# Patient Record
Sex: FEMALE | Race: WHITE | Employment: FULL TIME | URBAN - METROPOLITAN AREA
[De-identification: names, ages, dates, MRNs, and addresses within clinical notes are randomized per-mention and may not be internally consistent; named-entity substitution may affect disease eponyms.]

---

## 2018-08-09 ENCOUNTER — OFFICE VISIT (OUTPATIENT)
Dept: FAMILY MEDICINE | Facility: CLINIC | Age: 23
End: 2018-08-09

## 2018-08-09 VITALS
SYSTOLIC BLOOD PRESSURE: 111 MMHG | TEMPERATURE: 98 F | DIASTOLIC BLOOD PRESSURE: 67 MMHG | HEART RATE: 64 BPM | RESPIRATION RATE: 16 BRPM | OXYGEN SATURATION: 98 % | WEIGHT: 128.2 LBS

## 2018-08-09 DIAGNOSIS — B07.8 COMMON WART: Primary | ICD-10-CM

## 2018-08-09 PROCEDURE — 17110 DESTRUCTION B9 LES UP TO 14: CPT | Performed by: NURSE PRACTITIONER

## 2018-08-09 RX ORDER — SERTRALINE HYDROCHLORIDE 100 MG/1
200 TABLET, FILM COATED ORAL DAILY
COMMUNITY
Start: 2018-04-20

## 2018-08-09 RX ORDER — LEVONORGESTREL/ETHIN.ESTRADIOL 0.1-0.02MG
1 TABLET ORAL DAILY
COMMUNITY
Start: 2017-12-28 | End: 2023-05-08

## 2018-08-09 NOTE — PROGRESS NOTES
SUBJECTIVE:   Olga Turner is a 22 year old female who presents to clinic today for the following health issues:      WART(S)      Onset: several  months     Description (location/number): knees and thighs and on on her abdomen area    Accompanying signs and symptoms: Painful: no     History: prior warts: YES- had removed some in the pass, but came back and got bigger.      Therapies tried and outcome: OTC wart removal, but it has been making it worse.    Has tried compound W with no relief.  Warts spreading and getting bigger.    Does not have medical insurance and wants to get them treated today.  Aware of the cost.    Problem list and histories reviewed & adjusted, as indicated.  Additional history: as documented    Reviewed and updated as needed this visit by clinical staff  Allergies  Meds  Problems  Med Hx  Surg Hx  Fam Hx       Reviewed and updated as needed this visit by Provider  Meds  Problems  Med Hx  Surg Hx  Fam Hx         ROS:  Constitutional, HEENT, cardiovascular, pulmonary, gi and gu systems are negative, except as otherwise noted.    OBJECTIVE:     /67 (BP Location: Left arm, Patient Position: Sitting, Cuff Size: Adult Regular)  Pulse 64  Temp 98  F (36.7  C) (Oral)  Resp 16  Wt 128 lb 3.2 oz (58.2 kg)  LMP 08/04/2018 (Exact Date)  SpO2 98%  There is no height or weight on file to calculate BMI.  GENERAL: healthy, alert and no distress  EYES: Eyes grossly normal to inspection, PERRL and conjunctivae and sclerae normal  ABDOMEN: soft, nontender, no hepatosplenomegaly, no masses and bowel sounds normal  MS: no gross musculoskeletal defects noted, no edema  SKIN: 10+ scattered warts, 3 clusters of 2-3mm verrucous papules on both knees, 4 scattered 3 mm lesions on bilateral thighs and one  2mm lesion on RLQ abdomen.    PSYCH: mentation appears normal, affect normal/bright      ASSESSMENT/PLAN:       ICD-10-CM    1. Common wart B07.8 levonorgestrel-ethinyl estradiol  (ANA LEWIS,LESSINA) 0.1-20 MG-MCG per tablet     sertraline (ZOLOFT) 50 MG tablet     DESTRUCT BENIGN LESION, UP TO 14     All  Warts on pt pared with 10 blade, froze with 3 freeze/thaw cycles.  Advised to return in 4 weeks if warts present.    Discussed possibility of scarring.  Discussed warts resistant to treatment, important to f/u and not wiat if persists or worsens.      SHADY Cox CNP  [facility]

## 2018-08-09 NOTE — MR AVS SNAPSHOT
"              After Visit Summary   2018    Olga Turner    MRN: [id number]           Patient Information     Date Of Birth          1995        Visit Information        Provider Department      2018 3:20 PM Tory Aden APRN CNP [facility]        Today's Diagnoses     Common wart    -  1       Follow-ups after your visit        Who to contact     If you have questions or need follow up information about today's clinic visit or your schedule please contact [facility] directly at [phone].  Normal or non-critical lab and imaging results will be communicated to you by [employer], letter or phone within 4 business days after the clinic has received the results. If you do not hear from us within 7 days, please contact the clinic through [employer]Hub[employer] or phone. If you have a critical or abnormal lab result, we will notify you by phone as soon as possible.  Submit refill requests through [employer] or call your pharmacy and they will forward the refill request to us. Please allow 3 business days for your refill to be completed.          Additional Information About Your Visit        MyChart Information     [employer] lets you send messages to your doctor, view your test results, renew your prescriptions, schedule appointments and more. To sign up, go to [email or web address] . Click on \"Log in\" on the left side of the screen, which will take you to the Welcome page. Then click on \"Sign up Now\" on the right side of the page.     You will be asked to enter the access code listed below, as well as some personal information. Please follow the directions to create your username and password.     Your access code is: [email or web address]  Expires: 2018  4:07 PM     Your access code will  in 90 days. If you need help or a new code, please call your [facility] or [phone].        Care EveryWhere ID     This is your Care EveryWhere ID. This could be used by other " organizations to access your [place] medical records  [id number]        Your Vitals Were     Pulse Temperature Respirations Last Period Pulse Oximetry       64 98  F (36.7  C) (Oral) 16 08/04/2018 (Exact Date) 98%        Blood Pressure from Last 3 Encounters:   08/09/18 111/67    Weight from Last 3 Encounters:   08/09/18 128 lb 3.2 oz (58.2 kg)              We Performed the Following     DESTRUCT BENIGN LESION, UP TO 14        Primary Care Provider Fax #    Physician No Ref-Primary [phone]       No address on file        Equal Access to Services     [facility]: Hadii aad ku hadasho Soomaali, waaxda luqadaha, qaybta kaalmada adeegyada, xiang montejo . So [facility] [place] [phone].    ATENCIÓN: Si habla español, tiene a mackay disposición servicios gratuitos de asistencia lingüística. Llame al [phone].    We comply with applicable federal civil rights laws and [place] laws. We do not discriminate on the basis of race, color, national origin, age, disability, sex, sexual orientation, or gender identity.            Thank you!     Thank you for choosing [facility]  for your care. Our goal is always to provide you with excellent care. Hearing back from our patients is one way we can continue to improve our services. Please take a few minutes to complete the written survey that you may receive in the mail after your visit with us. Thank you!             Your Updated Medication List - Protect others around you: Learn how to safely use, store and throw away your medicines at www.disposemymeds.org.          This list is accurate as of 8/9/18  4:07 PM.  Always use your most recent med list.                   Brand Name Dispense Instructions for use Diagnosis    levonorgestrel-ethinyl estradiol 0.1-20 MG-MCG per tablet    ANA LEWIS LESSINA     Take 1 tablet by mouth daily    Common wart       sertraline 50 MG tablet    ZOLOFT     Take 50 mg by mouth daily    Common wart

## 2018-09-26 ENCOUNTER — HEALTH MAINTENANCE LETTER (OUTPATIENT)
Age: 23
End: 2018-09-26

## 2018-10-26 ENCOUNTER — OFFICE VISIT (OUTPATIENT)
Dept: FAMILY MEDICINE | Facility: CLINIC | Age: 23
End: 2018-10-26

## 2018-10-26 VITALS
OXYGEN SATURATION: 100 % | BODY MASS INDEX: 24.29 KG/M2 | RESPIRATION RATE: 16 BRPM | SYSTOLIC BLOOD PRESSURE: 103 MMHG | WEIGHT: 132 LBS | HEART RATE: 56 BPM | TEMPERATURE: 98.1 F | HEIGHT: 62 IN | DIASTOLIC BLOOD PRESSURE: 63 MMHG

## 2018-10-26 DIAGNOSIS — B07.8 COMMON WART: Primary | ICD-10-CM

## 2018-10-26 PROCEDURE — 17110 DESTRUCTION B9 LES UP TO 14: CPT | Performed by: NURSE PRACTITIONER

## 2018-10-26 NOTE — MR AVS SNAPSHOT
"              After Visit Summary   10/26/2018    Olga Turner    MRN: [id number]           Patient Information     Date Of Birth          1995        Visit Information        Provider Department      10/26/2018 3:00 PM Chela Serrano APRN CNP [facility]        Today's Diagnoses     Common wart    -  1       Follow-ups after your visit        Who to contact     If you have questions or need follow up information about today's clinic visit or your schedule please contact [facility] directly at [phone].  Normal or non-critical lab and imaging results will be communicated to you by MyChart, letter or phone within 4 business days after the clinic has received the results. If you do not hear from us within 7 days, please contact the clinic through MyChart or phone. If you have a critical or abnormal lab result, we will notify you by phone as soon as possible.  Submit refill requests through [employer] or call your pharmacy and they will forward the refill request to us. Please allow 3 business days for your refill to be completed.          Additional Information About Your Visit        Care EveryWhere ID     This is your Care EveryWhere ID. This could be used by other organizations to access your [facility] medical records  [id number]        Your Vitals Were     Pulse Temperature Respirations Height Pulse Oximetry BMI (Body Mass Index)    56 98.1  F (36.7  C) (Oral) 16 5' 2\" (1.575 m) 100% 24.14 kg/m2       Blood Pressure from Last 3 Encounters:   10/26/18 103/63   08/09/18 111/67    Weight from Last 3 Encounters:   10/26/18 132 lb (59.9 kg)   08/09/18 128 lb 3.2 oz (58.2 kg)              We Performed the Following     DESTRUCT BENIGN LESION, UP TO 14        Primary Care Provider Office Phone # Fax #    SHADY Cox [phone] [address] [facility] [address]        Equal Access to Services     [facility]ARGAS [facility]: Hadii " yolande Smith, wajosselinda lucaryadaha, qaybta kaalwilman alonzo, waxcharan idiin haybridgettnadir yodershellicarroll schaeferMatthiasrandi alfredo. So [facility] [phone].    ATENCIÓN: Si habla español, tiene a mackay disposición servicios gratuitos de asistencia lingüística. Gilmaame al [phone].    We comply with applicable federal civil rights laws and [place] laws. We do not discriminate on the basis of race, color, national origin, age, disability, sex, sexual orientation, or gender identity.            Thank you!     Thank you for choosing [facility]  for your care. Our goal is always to provide you with excellent care. Hearing back from our patients is one way we can continue to improve our services. Please take a few minutes to complete the written survey that you may receive in the mail after your visit with us. Thank you!             Your Updated Medication List - Protect others around you: Learn how to safely use, store and throw away your medicines at www.disposemymeds.org.          This list is accurate as of 10/26/18 11:59 PM.  Always use your most recent med list.                   Brand Name Dispense Instructions for use Diagnosis    levonorgestrel-ethinyl estradiol 0.1-20 MG-MCG per tablet    ANA LEWIS LESSINA     Take 1 tablet by mouth daily    Common wart       sertraline 50 MG tablet    ZOLOFT     Take 50 mg by mouth daily    Common wart

## 2018-10-26 NOTE — PROGRESS NOTES
SUBJECTIVE:   Olga Turner is a 23 year old female who presents to clinic today for the following health issues:      WART(S)      Onset: 1 month     Description (location/number):Olga is in the clinic today to have the warts on her legs treated. They have been present for a while and the treated in August. One of the warts did go away, but the other warts did not. Today she has 2 warts on each knee and a wart o each shin. She denies any other issues today.     Accompanying signs and symptoms: Painful: no     History: prior warts: YES    Therapies tried and outcome: tried OTC brush - did not help       Problem list and histories reviewed & adjusted, as indicated.  Additional history: as documented    Patient Active Problem List   Diagnosis     Common wart     History reviewed. No pertinent surgical history.    Social History   Substance Use Topics     Smoking status: Never Smoker     Smokeless tobacco: Never Used     Alcohol use Yes      Comment: occ     Family History   Problem Relation Age of Onset     No Known Problems Mother      No Known Problems Father          Current Outpatient Prescriptions   Medication Sig Dispense Refill     levonorgestrel-ethinyl estradiol (AVIANE,ALESSE,LESSINA) 0.1-20 MG-MCG per tablet Take 1 tablet by mouth daily       sertraline (ZOLOFT) 50 MG tablet Take 50 mg by mouth daily       Allergies   Allergen Reactions     Penicillin G Other (See Comments)     amoxicillin: rash    May use PCN/ceph-NO amox/amp     Erythromycin GI Disturbance and Rash     No lab results found.   BP Readings from Last 3 Encounters:   10/26/18 103/63   08/09/18 111/67    Wt Readings from Last 3 Encounters:   10/26/18 132 lb (59.9 kg)   08/09/18 128 lb 3.2 oz (58.2 kg)                  Labs reviewed in EPIC    Reviewed and updated as needed this visit by clinical staff  Tobacco  Allergies  Meds  Med Hx  Surg Hx  Fam Hx  Soc Hx      Reviewed and updated as needed this visit by Provider      "    ROS:  Constitutional, HEENT, cardiovascular, pulmonary, GI, , musculoskeletal, neuro, skin, endocrine and psych systems are negative, except as otherwise noted.    OBJECTIVE:     /63  Pulse 56  Temp 98.1  F (36.7  C) (Oral)  Resp 16  Ht 5' 2\" (1.575 m)  Wt 132 lb (59.9 kg)  SpO2 100%  BMI 24.14 kg/m2  Body mass index is 24.14 kg/(m^2).    GENERAL APPEARANCE: healthy, alert and no distress. Smiling.   SKIN: warm and dry  She has domed papules consistent with warts: 2 on each knee and one on each shin.     PSYCH: mentation appears normal and affect normal/bright.  Good eye contact.      ASSESSMENT/PLAN:     (B07.8) Common wart  (primary encounter diagnosis)  Comment:   Plan: DESTRUCT BENIGN LESION, UP TO 14        The treatments, side effects and failure rates are discussed. Liquid nitrogen was applied to the warts for 3 bursts 10 seconds on each. The expected skin reaction including erythema, pain, scabbing, blistering and hypopigmented scar formation was discussed. See at intervals until warts resolved.           SHADY Dhillon [facility]  "

## 2023-04-28 ENCOUNTER — TELEPHONE (OUTPATIENT)
Dept: BEHAVIORAL HEALTH | Facility: CLINIC | Age: 28
End: 2023-04-28
Payer: COMMERCIAL

## 2023-04-28 NOTE — TELEPHONE ENCOUNTER
Pt is a(n) adult (18+ out of HS) Seeking as eval for Adult Mental Health DA for evaluation and recommendations. and is interested in Adult Evaluation only - no specific program requested.  Appointment scheduled by:  Patient.  (If patient is self-pay, we much complete a Cost Estimate and save it to the pt chart)  Caller name:  Olga    Caller phone #: [phone]  If in person, please state reason why:  NA  Brief reason for appt:  Pt looking for DA eval for MH DX.     Cost estimate Did not get completed.  Contact information verified/updated: Yes

## 2023-05-02 ENCOUNTER — TELEPHONE (OUTPATIENT)
Dept: BEHAVIORAL HEALTH | Facility: CLINIC | Age: 28
End: 2023-05-02
Payer: COMMERCIAL

## 2023-05-02 NOTE — TELEPHONE ENCOUNTER
----- Message from Ratna Mari sent at 5/2/2023  2:16 PM CDT -----  Regarding: BENEFITS FOR APPT TOMORROW, 5/3  Good Afternoon-    Please request benefits for appt tomorrow ASAP.    Thank you!    Ratna CRENSHAW  Assessment Center Coordinator

## 2023-05-03 ENCOUNTER — HOSPITAL ENCOUNTER (OUTPATIENT)
Dept: BEHAVIORAL HEALTH | Facility: CLINIC | Age: 28
Discharge: HOME | End: 2023-05-03
Attending: FAMILY MEDICINE | Admitting: FAMILY MEDICINE
Payer: COMMERCIAL

## 2023-05-03 PROCEDURE — 90791 PSYCH DIAGNOSTIC EVALUATION: CPT | Mod: GT,95 | Performed by: COUNSELOR

## 2023-05-03 RX ORDER — BUSPIRONE HYDROCHLORIDE 10 MG/1
1 TABLET ORAL
COMMUNITY
Start: 2023-02-12

## 2023-05-03 RX ORDER — HYDROXYZINE HYDROCHLORIDE 25 MG/1
25 TABLET, FILM COATED ORAL EVERY 6 HOURS PRN
COMMUNITY
Start: 2022-05-01

## 2023-05-03 ASSESSMENT — COLUMBIA-SUICIDE SEVERITY RATING SCALE - C-SSRS
TOTAL  NUMBER OF INTERRUPTED ATTEMPTS LIFETIME: NO
TOTAL  NUMBER OF ABORTED OR SELF INTERRUPTED ATTEMPTS LIFETIME: NO
1. HAVE YOU WISHED YOU WERE DEAD OR WISHED YOU COULD GO TO SLEEP AND NOT WAKE UP?: NO
ATTEMPT LIFETIME: NO
6. HAVE YOU EVER DONE ANYTHING, STARTED TO DO ANYTHING, OR PREPARED TO DO ANYTHING TO END YOUR LIFE?: NO
2. HAVE YOU ACTUALLY HAD ANY THOUGHTS OF KILLING YOURSELF?: NO

## 2023-05-03 ASSESSMENT — ANXIETY QUESTIONNAIRES
7. FEELING AFRAID AS IF SOMETHING AWFUL MIGHT HAPPEN: NEARLY EVERY DAY
1. FEELING NERVOUS, ANXIOUS, OR ON EDGE: NEARLY EVERY DAY
7. FEELING AFRAID AS IF SOMETHING AWFUL MIGHT HAPPEN: NEARLY EVERY DAY
2. NOT BEING ABLE TO STOP OR CONTROL WORRYING: NEARLY EVERY DAY
6. BECOMING EASILY ANNOYED OR IRRITABLE: MORE THAN HALF THE DAYS
5. BEING SO RESTLESS THAT IT IS HARD TO SIT STILL: SEVERAL DAYS
GAD7 TOTAL SCORE: 17
8. IF YOU CHECKED OFF ANY PROBLEMS, HOW DIFFICULT HAVE THESE MADE IT FOR YOU TO DO YOUR WORK, TAKE CARE OF THINGS AT HOME, OR GET ALONG WITH OTHER PEOPLE?: VERY DIFFICULT
GAD7 TOTAL SCORE: 17
GAD7 TOTAL SCORE: 17
4. TROUBLE RELAXING: MORE THAN HALF THE DAYS
IF YOU CHECKED OFF ANY PROBLEMS ON THIS QUESTIONNAIRE, HOW DIFFICULT HAVE THESE PROBLEMS MADE IT FOR YOU TO DO YOUR WORK, TAKE CARE OF THINGS AT HOME, OR GET ALONG WITH OTHER PEOPLE: VERY DIFFICULT
3. WORRYING TOO MUCH ABOUT DIFFERENT THINGS: NEARLY EVERY DAY

## 2023-05-03 ASSESSMENT — PATIENT HEALTH QUESTIONNAIRE - PHQ9
10. IF YOU CHECKED OFF ANY PROBLEMS, HOW DIFFICULT HAVE THESE PROBLEMS MADE IT FOR YOU TO DO YOUR WORK, TAKE CARE OF THINGS AT HOME, OR GET ALONG WITH OTHER PEOPLE: VERY DIFFICULT
SUM OF ALL RESPONSES TO PHQ QUESTIONS 1-9: 7
SUM OF ALL RESPONSES TO PHQ QUESTIONS 1-9: 7

## 2023-05-03 NOTE — PROGRESS NOTES
"    [facility]      PATIENT'S NAME: Olga Turner  PREFERRED NAME: Olga  PRONOUNS: she/her/hers     MRN: [id number]  : 1995  ADDRESS: [address] [place] [id number]  ACCT. NUMBER:  [id number]  DATE OF SERVICE: 23  START TIME: 1300  END TIME: 1430  PREFERRED PHONE: [phone]  May we leave a program related message: Yes  SERVICE MODALITY:  Phone Visit:      Provider verified identity through the following two step process.  Patient provided:  Patient  and Patient address    Telephone Visit: The patient's condition can be safely assessed and treated via synchronous audio telemedicine encounter.      Reason for Audio Telemedicine Visit: Services only offered telehealth    Originating Site (Patient Location): Patient's home    Distant Site (Provider Location): Provider Remote Setting- Home Office    Consent:  The patient/guardian has verbally consented to:     1. The potential risks and benefits of telemedicine (telephone visit) versus in person care;    The patient has been notified of the following:      \"We have found that certain health care needs can be provided without the need for a face to face visit.  This service lets us provide the care you need with a phone conversation.       I will have full access to your [facility] medical record during this entire phone call.   I will be taking notes for your medical record.      Since this is like an office visit, we will bill your insurance company for this service.       There are potential benefits and risks of telephone visits (e.g. limits to patient confidentiality) that differ from in-person visits.?Confidentiality still applies for telephone services, and nobody will record the visit.  It is important to be in a quiet, private space that is free of distractions (including cell phone or other devices) during the visit.??      If during the course of the call I " "believe a telephone visit is not appropriate, you will not be charged for this service\"     Consent has been obtained for this service by care team member: Yes     [facility] ADULT Mental Health DIAGNOSTIC ASSESSMENT    Identifying Information:  Patient is a 27 year old,  individual.  Patient was referred for an assessment byfamily.  Patient attended the session alone.    Chief Complaint:   The reason for seeking services at this time is: \"Psych eval\".  The problem(s) began 01/01/20.    Patient has attempted to resolve these concerns in the past through medications, various treatments for alcohol.    Social/Family History:  Patient reported they grew up in [facility].  They were raised by biological parents  .  Parents were always together.  Patient reported that their childhood was \"alcohol use by dad, .  Patient described their current relationships with family of origin as \"get along with dad, does not see eye to eye with mom\".     The patient describes their cultural background as .  Cultural influences and impact on patient's life structure, values, norms, and healthcare: NA.  Contextual influences on patient's health include: Contextual Factors: Individual Factors Recent relapse.    These factors will be addressed in the Preliminary Treatment plan. Patient identified their preferred language to be English. Patient reported they does not need the assistance of an  or other support involved in therapy.     Patient reported had no significant delays in developmental tasks.   Patient's highest education level was college graduate  .  Patient identified the following learning problems: none reported.  Modifications will not be used to assist communication in therapy.  Patient reports they are  able to understand written materials.    Patient reported the following relationship history:  Patient is currently in a relationship.  Patient's current relationship status is has a partner " or significant other since end of December 2022.   Patient identified their sexual orientation as heterosexual.  Patient reported having  0 child(beverly). Patient identified partner; friends as part of their support system.  Patient identified the quality of these relationships as poor,  .      Patient's current living/housing situation involves staying in own home/apartment.  The immediate members of family and household include Miquel Marshall, 30,Boyfriend  and they report that housing is stable.    Patient is currently employed fulltime.  Patient reports their finances are obtained through employment; disability. Patient does identify finances as a current stressor.      Patient reported that they have not been involved with the legal system.  Patient does not report being under probation/ parole/ jurisdiction. They are not under any current court jurisdiction. .    Patient's Strengths and Limitations:  Patient identified the following strengths or resources that will help them succeed in treatment: commitment to health and well being. Things that may interfere with the patient's success in treatment include: none identified.     Assessments:  PHQ9:       5/3/2023    12:52 PM   PHQ-9 SCORE   PHQ-9 Total Score MyChart 7 (Mild depression)   PHQ-9 Total Score 7     GAD7:       5/3/2023    12:54 PM   LISA-7 SCORE   Total Score 17 (severe anxiety)   Total Score 17    Answers for HPI/ROS submitted by the patient on 5/3/2023  If you checked off any problems, how difficult have these problems made it for you to do your work, take care of things at home, or get along with other people?: Very difficult  PHQ9 TOTAL SCORE: 7  LISA 7 TOTAL SCORE: 17  CAGE-AID:       5/3/2023     1:01 PM   CAGE-AID Total Score   Total Score 4   Total Score MyChart 4 (A total score of 2 or greater is considered clinically significant)     PROMIS 10-Global Health (only subscores and total score):       5/3/2023     1:01 PM   PROMIS-10 Scores Only    Global Mental Health Score 9   Global Physical Health Score 16   PROMIS TOTAL - SUBSCORES 25     [place] Suicide Severity Rating Scale (Lifetime/Recent)      5/3/2023     1:00 PM   [place] Suicide Severity Rating (Lifetime/Recent)   1. Wish to be Dead (Lifetime) N   2. Non-Specific Active Suicidal Thoughts (Lifetime) N   Actual Attempt (Lifetime) N   Has subject engaged in non-suicidal self-injurious behavior? (Lifetime) N   Interrupted Attempts (Lifetime) N   Aborted or Self-Interrupted Attempt (Lifetime) N   Preparatory Acts or Behavior (Lifetime) N   Calculated C-SSRS Risk Score (Lifetime/Recent) No Risk Indicated       Personal and Family Medical History:  Patient does report a family history of mental health concerns.  Patient reports family history includes Anxiety Disorder in her mother; Substance Abuse in her father and paternal grandfather..     Patient does report Mental Health Diagnosis and/or Treatment.  Patient Patient reported the following previous diagnoses which include(s): an anxiety disorder; an eating disorder; obsessive compulsive disorder .  Patient reported symptoms began as a child.  Patient has received mental health services in the past:  day treatment; psychiatry; partial hospitalization program; Intensive Residential Treatment Services (IRTS)  .  Psychiatric Hospitalizations: [facility].  Patient denies a history of civil commitment.  Currently, patient is not receiving other mental health services.  These include primary care provider at [facility].  For follow-up on TBS.         Patient has had a physical exam to rule out medical causes for current symptoms.  Date of last physical exam was greater than a year ago and client was encouraged to schedule an exam with PCP. The patient has a non-[facility] Primary Care Provider. Their PCP is through [facility]..  Patient reports no current medical concerns.  Patient denies any issues with pain..   There are not significant appetite /  nutritional concerns / weight changes.   Patient does not report a history of head injury / trauma / cognitive impairment.      Patient reports current meds as:   Outpatient Medications Marked as Taking for the 5/3/23 encounter (Hospital Encounter) with [facility]   Medication Sig     busPIRone (BUSPAR) 10 MG tablet Take 1 tablet by mouth 2 times daily     hydrOXYzine (ATARAX) 25 MG tablet Take 25 mg by mouth     levonorgestrel-ethinyl estradiol (AVIANE,ALESSE,LESSINA) 0.1-20 MG-MCG per tablet Take 1 tablet by mouth daily     sertraline (ZOLOFT) 50 MG tablet Take 50 mg by mouth daily       Medication Adherence:  Patient reports taking.  taking prescribed medications as prescribed.    Patient Allergies:    Allergies   Allergen Reactions     Penicillin G Other (See Comments)     amoxicillin: rash    May use PCN/ceph-NO amox/amp     Erythromycin GI Disturbance and Rash       Medical History:  History reviewed. No pertinent past medical history.      Current Mental Status Exam:   Appearance:  unable to assess due to telephone    Eye Contact:  unable to assess due to telephon   Psychomotor:  unable to assess due to telephone       Gait / station:  Unable to assess due to telephone  Attitude / Demeanor: Cooperative  Interested  Speech      Rate / Production: Normal/ Responsive      Volume:  Normal  volume      Language:  intact  Mood:   Normal  Affect:   unable to assess due to telephone    Thought Content: Clear   Thought Process: Logical       Associations: No loosening of associations  Insight:   Good   Judgment:  Intact   Orientation:  All  Attention/concentration: Good      Substance Use:  Patient did report a family history of substance use concerns; see medical history section for details.  Patient has received chemical dependency treatment in the past at various inpatient programs.  Patient has ever been to detox.      Patient is not currently receiving any chemical dependency treatment.  "          Substance History of use Age of first use Date of last use     Pattern and duration of use (include amounts and frequency)   Alcohol currently use   17 05/03/23 REPORTS SUBSTANCE USE: reports using substance 3 times per day and has 8 drinks at a time.   Patient reports heaviest use was intermittent periods of use but never longer than 5 days at a time.   Cannabis   used in the past 20 04/24/23 Patient reports using once in a \"blue moon\" and denies concerns.     Amphetamines   never used     REPORTS SUBSTANCE USE: N/A   Cocaine/crack    never used       REPORTS SUBSTANCE USE: N/A   Hallucinogens never used         REPORTS SUBSTANCE USE: N/A   Inhalants never used         REPORTS SUBSTANCE USE: N/A   Heroin never used         REPORTS SUBSTANCE USE: N/A   Other Opiates never used     REPORTS SUBSTANCE USE: N/A   Benzodiazepine   never used     REPORTS SUBSTANCE USE: N/A   Barbiturates never used     REPORTS SUBSTANCE USE: N/A   Over the counter meds never used     REPORTS SUBSTANCE USE: N/A   Caffeine currently use 17  5/3/23 REPORTS SUBSTANCE USE: reports using substance 1 times per day and has 1 . at a time.   Patient reports heaviest use is current use.   Nicotine  currently use 23 05/03/23 REPORTS SUBSTANCE USE: reports using substance 10 times per day and has 1 vape at a time.   Patient reports heaviest use is current use.   Other substances not listed above:  Identify:  never used     REPORTS SUBSTANCE USE: N/A     Patient reported the following problems as a result of their substance use: DUI; family problems; financial problems; relationship problems.    Substance Use: blackouts, daily use, family relationship problems due to substance use, social problems related to substance use and cravings/urges to use    Based on the positive CAGE score and clinical interview there  are indications of drug or alcohol abuse. Recommendation for substance abuse disorder evaluation with a substance use professional " was given. Therapist did recommend client to reduce use or abstain from alcohol or substance use. Therapist did recommend structured treatment and or community support (AA, 12 step group, etc.). Patient recommended to enter and complete DDP.      Significant Losses / Trauma / Abuse / Neglect Issues:   Patient did not  serve in the .  There are indications or report of significant loss, trauma, abuse or neglect issues related to: are no indications and client denies any losses, trauma, abuse, or neglect concerns.  Concerns for possible neglect are not present.     Safety Assessment:   Patient denies current homicidal ideation and behaviors.  Patient denies current self-injurious ideation and behaviors.    Patient reported placing themselves in unsafe environment(s) associated with substance use.  Patient denies any high risk behaviors associated with mental health symptoms.  Patient reports the following current concerns for their personal safety: None.  Patient reports there are not firearms in the house.       There are no firearms in the home..    History of Safety Concerns:  Patient denied a history of homicidal ideation.     Patient denied a history of personal safety concerns.    Patient denied a history of assaultive behaviors.    Patient denied a history of sexual assault behaviors.     Patient denied a history of risk behaviors associated with substance use.  Patient denies any history of high risk behaviors associated with mental health symptoms.  Patient reports the following protective factors: forward or future oriented thinking; dedication to family or friends; regular physical activity; secure attachment; commitment to well being; sense of meaning; healthy fear of risky behaviors or pain    Risk Plan:  See Recommendations for Safety and Risk Management Plan    Review of Symptoms per patient report:   Depression: No symptoms, Change in energy level, Difficulties concentrating and Change in  appetite  [place]:  No Symptoms  Psychosis: No Symptoms  Anxiety: Excessive worry, Nervousness, Physical complaints, such as headaches, stomachaches, muscle tension, Sleep disturbance, Ruminations, Poor concentration and Irritability  Panic:  Palpitations, Shortness of breath and Sense of impending doom  Post Traumatic Stress Disorder:  No Symptoms   Eating Disorder: No Symptoms  ADD / ADHD:  Inattentive, Difficulties listening, Poor task completion, Poor organizational skills, Distractibility, Intrudes and Restlessness/fidgety  Conduct Disorder: No symptoms  Autism Spectrum Disorder: No symptoms  Obsessive Compulsive Disorder: Counting    Patient reports the following compulsive behaviors and treatment history: Patient denies.      Diagnostic Criteria:   Generalized Anxiety Disorder  A. Excessive anxiety and worry about a number of events or activities (such as work or school performance).   B. The person finds it difficult to control the worry.  C. Select 3 or more symptoms (required for diagnosis). Only one item is required in children.   - Restlessness or feeling keyed up or on edge.    - Being easily fatigued.    - Difficulty concentrating or mind going blank.    - Irritability.    - Muscle tension.    - Sleep disturbance (difficulty falling or staying asleep, or restless unsatisfying sleep).   D. The focus of the anxiety and worry is not confined to features of an Axis I disorder.  E. The anxiety, worry, or physical symptoms cause clinically significant distress or impairment in social, occupational, or other important areas of functioning.   F. The disturbance is not due to the direct physiological effects of a substance (e.g., a drug of abuse, a medication) or a general medical condition (e.g., hyperthyroidism) and does not occur exclusively during a Mood Disorder, a Psychotic Disorder, or a Pervasive Developmental Disorder. Substance Use Disorder Substance is often taken in larger amounts or over a longer  period than was intended.  Met for:  Alcohol There is persistent desire or unsuccessful efforts to cut down or control use of the substance.  Met for:  Alcohol Craving, or a strong desire or urge to use the substance.  Met for:  Alcohol Continued use of the substance despite having persistent or recurrent social or interpersonal problems caused or exacerbated by the effects of its use.  Met for:  Alcohol Use of the substance is continued despite knowledge of having a persistent or recurrent physical or psychological problem that is likely to have been cause or exacerbated by the substance.  Met for:  Alcohol    Functional Status:  Patient reports the following functional impairments:  management of the household and or completion of tasks, relationship(s) and self-care.     Programmatic care:  Current LOCUS was assigned and patient needs the following level of care based on score 18  .    Clinical Summary:  1. Reason for assessment: to determine level of care  .  2. Psychosocial, Cultural and Contextual Factors: Recent relapse  .  3. Principal DSM5 Diagnoses  (Sustained by DSM5 Criteria Listed Above):   300.02 (F41.1) Generalized Anxiety Disorder.  4. Other Diagnoses that is relevant to services:   Substance-Related & Addictive Disorders Alcohol Use Disorder   303.90 (F10.20) Moderate ..  5. Provisional Diagnosis:  Further diagnosis clarification will be beneficial  6. Prognosis: Expect Improvement.  7. Likely consequences of symptoms if not treated: higher level of care.  8. Client strengths include:  open to learning and open to suggestions / feedback .     Recommendations:     1. Plan for Safety and Risk Management:   Safety and Risk: A safety and risk management plan has been developed including: When the [place] Suicide Severity Rating Scale has been completed, the patient identifies lifetime history of suicidal ideation and/or Suicidal Behavior that is greater than 10 years.      The recommendation is to  provide the Brief Safety Plan:    Adult Short Safety Plan:   Name: Olga Turner  YOB: 1995  Date: May 3, 2023   My primary care provider: [facility]     My Triggers:  Substance Use .     Additional People, Places, and Things that I can access for support: Current providers                GREEN    Good Control  1. I feel good  2. No suicidal thoughts   3. Can work, sleep and play      Action Steps  1. Self-care: balanced meals, exercising, sleep practices, etc.  2. Take your medications as prescribed.  3. Continue meetings with therapist and prescriber.  4.  Do the healthy things that I enjoy.           YELLOW  Getting Worse  I have ANY of these:  1. I do not feel good  2. Difficulty Concentrating  3. Sleep is changing  4. Increase/Change in my thoughts to hurt self and/or others, but I can still manage and not act on it.   5. Not taking care of self.               Action Steps (in addition to the above):  1. Inform your therapist and psychiatric prescriber/PCP.  2. Keep taking your medications as prescribed.    3. Turn to people you can ask for help.  4. Use internal coping strategies -see below.  5. Create safe environment: notify friends/family of increase in symptoms             RED  Get Help  If I have ANY of these:  1. Current and uncontrollable thoughts and/or behaviors to hurt self and/or others.      Actions to manage my safety  1. Contact your emergency person   2. Call or Text [phone]  3. Call my crisis team- [facility] [phone] [facility] Psych Emergencies  3. Or Call 911 or go to the emergency room right away          My Internal Coping Strategies include the following:  use my coping skills      Safety Concerns  How To Identify Situations That Make Your Mental Health Worse:  Triggers are things that make your mental health worse.  Look at the list below to help you find your triggers and what you can do about them.     1. Identify Early Warning  Signs:    Sometimes symptoms return, even when people do their best to stay well. Symptoms can develop over a short period of time with little or no warning, but most of the time they emerge gradually over several weeks.  Early warning signs are changes that people experience when a relapse is starting. Some early warning signs are common and others are not as common.   Common Early Warning Signs:    Urges to use drugs or alcohol     2. Identify action steps to take when warning signs are noticed:    Taking Action- It is important to take action if you are experiencing early warning signs of a relapse.  The faster you act, the more likely it is that you can avoid a full relapse.  It is helpful to identify several specific ways to cope with symptoms.      The following is my list of symptoms and coping strategies that I can use when they are present:    Symptom Coping Strategies   Anxiety -Talk with someone in your support system and let him or her know how you are feeling.  -Use relaxation techniques such as deep breathing or imagery.  -Use positive affirmations to counteract negative self-talk such as  I am learning to let go of worry.    Depression - Schedule your day; include activities you have to do and activities you enjoy doing.  - Get some exercise - walk, run, bike, or swim.  - Give yourself credit for even the smallest things you get done.   Sleep Difficulties   - Go to sleep at the same time every day.  - Do something relaxing before bed, such as drinking herbal tea or listening to music.  - Avoid having discussions about upsetting topics before going to bed.   Delusions   - Distract yourself from the disturbing thought by doing something that requires your attention such as a puzzle.  - Check out your beliefs by talking to someone you trust.    Hallucinations   - Use headphones to listen to music.  - Tell voices to  stop  or say to yourself,  I am safe.   - Ignore the hallucinations as much as possible;  focus on other things.   Concentration Difficulties - Minimize distractions so there is only one thing for you to focus on at a time.    - Ask the person you are having a conversation with to slow down or repeat things you are unsure of.           .  Patient consented to co-developed safety plan.  Safety and risk management plan was completed.  Patient agreed to use safety plan should any safety concerns arise.  A copy was given to the patient..          Report to child / adult protection services was NA.     2. Patient's identified None identified.     3. Initial Treatment will focus on:    Depressed Mood - .  Anxiety - .  Alcohol / Substance Use - ..     4. Resources/Service Plan:    services are not indicated.   Modifications to assist communication are not indicated.   Additional disability accommodations are not indicated.      5. Collaboration:   Collaboration / coordination of treatment will be initiated with the following  support professionals: primary care physician.      6.  Referrals:   The following referral(s) will be initiated: [facility]. Next Scheduled Appointment: Patient placed on wailist.      A Release of Information has been obtained for the following: Emergency Contact.     Emergency Contact Miquel Marshall was obtained.      Clinical Substantiation/medical necessity for the above recommendations:    Patient is a 27 year old who presents with Anxiety and Alcohol Use.  Patient reports history of Anxiety and Alcohol use.  Patient has historically been able to manage symptoms through medications and sobriety.     Patients acute suicide risk was determined to be  minimal due to the following factors:Denial of suicidal thoughts, no history of suicide attempts. Patient denies current thoughts of suicidal ideation and self harm and reports ability to keep self safe.  Patient completed and reviewed safety plan with  and reports ability to follow plan.    Patient is not currently under the  influence of alcohol or illicit substances, denies experiencing command hallucinations, and has no immediate access to firearms. Patient reports the following protective factors: dedication to family/friends, safe and stable environment, daily obligations, committment to well-being, sense of personal control or determination and access to a variety of clinical interventions    Patient identifies the following concerns with substance use: Patient unable to establish sobriety.   discussed approaches to manage substance use and discussed substance use treatment history and support group participation.       Patient would benefit from more extensive mental health support such as a Dual Diagnosis Outpatient Program to help mitigate risk of hospitalization or suicidality. Patient is in agreement with plan. Options for treatment and follow-up care were reviewed with the patient. Patient was engaged and actively involved in the decision making process. Patient verbalized understanding of the options discussed and was satisfied with the final plan.  Patient is referred to: DDP with [facility].  Patient declined alternative placement options at this time and agreed to reach out to  should this change.  Contact information was provided.    7. JESSICA:    JESSICA:  Discussed the general effects of drugs and alcohol on health and well-being, Discussed the impact of drugs and alcohol when used during pregnancy and Attend a sober community support program including AA, [facility], [facility], etc.).. Provider gave patient printed information about the  effects of chemical use on their health and well being. Recommendations:  To enter and participate in a Dual Diagnosis Program .     8. Records:   These were reviewed at time of assessment.   Information in this assessment was obtained from the medical record and  provided by patient who is a good historian.    Patient will have open access to their mental health  medical record.    9.   Interactive Complexity: No      Provider Name/ Credentials:  Shama Brannon [facility]  May 3, 2023          LOCUS Worksheet     Name: Olga Turner MRN: [id number]    : 1995      Gender:  female    PMI:     [facility]   Provider NPI:  [id number]    Actual level of Care Provided:  psychiatry    Service(s) receiving or referred to:  DDP    Reason for Variance: higher level of care      Rating completed by: [facility]      I. Risk of Harm:   1      Minimal Risk of Harm    II. Functional Status:   3      Moderate Impairment    III. Co-Morbidity:   3      Significant Co-Morbidity    IV - A. Recovery Environment - Level of Stress:   3      Moderately Stress Environment    IV - B. Recovery Environment - Level of Support:   3      Limited Support in Environment    V. Treatment and Recovery History:   3      Moderate to Equivocal Response to Treatment and Recovery Management    VI. Engagement and Recovery Project:   2      Positive Engagement and Recovery       18 Composite Score    Level of Care Recommendation:   17 to 19       High Intensity Community Based Services

## 2023-05-07 ENCOUNTER — HOSPITAL ENCOUNTER (EMERGENCY)
Facility: CLINIC | Age: 28
Discharge: HOME | End: 2023-05-08
Attending: EMERGENCY MEDICINE | Admitting: EMERGENCY MEDICINE
Payer: COMMERCIAL

## 2023-05-07 DIAGNOSIS — F10.920 ALCOHOLIC INTOXICATION WITHOUT COMPLICATION (H): ICD-10-CM

## 2023-05-07 DIAGNOSIS — U07.1 COVID-19: ICD-10-CM

## 2023-05-07 DIAGNOSIS — F32.A DEPRESSION, UNSPECIFIED DEPRESSION TYPE: ICD-10-CM

## 2023-05-07 LAB
ANION GAP SERPL CALCULATED.3IONS-SCNC: 18 MMOL/L (ref 7–15)
BUN SERPL-MCNC: 12.2 MG/DL (ref 6–20)
CALCIUM SERPL-MCNC: 9.5 MG/DL (ref 8.6–10)
CHLORIDE SERPL-SCNC: 97 MMOL/L (ref 98–107)
CREAT SERPL-MCNC: 0.76 MG/DL (ref 0.51–0.95)
DEPRECATED HCO3 PLAS-SCNC: 23 MMOL/L (ref 22–29)
ETHANOL SERPL-MCNC: 0.35 G/DL
GFR SERPL CREATININE-BSD FRML MDRD: >90 ML/MIN/1.73M2
GLUCOSE SERPL-MCNC: 117 MG/DL (ref 70–99)
HCG SERPL QL: NEGATIVE
POTASSIUM SERPL-SCNC: 3.4 MMOL/L (ref 3.4–5.3)
SARS-COV-2 RNA RESP QL NAA+PROBE: POSITIVE
SODIUM SERPL-SCNC: 138 MMOL/L (ref 136–145)

## 2023-05-07 PROCEDURE — 99285 EMERGENCY DEPT VISIT HI MDM: CPT | Mod: 25

## 2023-05-07 PROCEDURE — 250N000013 HC RX MED GY IP 250 OP 250 PS 637: Performed by: EMERGENCY MEDICINE

## 2023-05-07 PROCEDURE — 36415 COLL VENOUS BLD VENIPUNCTURE: CPT | Performed by: EMERGENCY MEDICINE

## 2023-05-07 PROCEDURE — C9803 HOPD COVID-19 SPEC COLLECT: HCPCS

## 2023-05-07 PROCEDURE — U0003 INFECTIOUS AGENT DETECTION BY NUCLEIC ACID (DNA OR RNA); SEVERE ACUTE RESPIRATORY SYNDROME CORONAVIRUS 2 (SARS-COV-2) (CORONAVIRUS DISEASE [COVID-19]), AMPLIFIED PROBE TECHNIQUE, MAKING USE OF HIGH THROUGHPUT TECHNOLOGIES AS DESCRIBED BY CMS-2020-01-R: HCPCS | Performed by: EMERGENCY MEDICINE

## 2023-05-07 PROCEDURE — 82077 ASSAY SPEC XCP UR&BREATH IA: CPT | Performed by: EMERGENCY MEDICINE

## 2023-05-07 PROCEDURE — 80048 BASIC METABOLIC PNL TOTAL CA: CPT | Performed by: EMERGENCY MEDICINE

## 2023-05-07 PROCEDURE — 84703 CHORIONIC GONADOTROPIN ASSAY: CPT | Performed by: EMERGENCY MEDICINE

## 2023-05-07 RX ADMIN — NICOTINE POLACRILEX 2 MG: 2 GUM, CHEWING ORAL at 21:31

## 2023-05-07 ASSESSMENT — ACTIVITIES OF DAILY LIVING (ADL)
ADLS_ACUITY_SCORE: 33
ADLS_ACUITY_SCORE: 35

## 2023-05-07 ASSESSMENT — ENCOUNTER SYMPTOMS
FEVER: 0
ABDOMINAL PAIN: 0

## 2023-05-08 VITALS
BODY MASS INDEX: 20.38 KG/M2 | RESPIRATION RATE: 16 BRPM | HEIGHT: 63 IN | TEMPERATURE: 98.8 F | SYSTOLIC BLOOD PRESSURE: 128 MMHG | OXYGEN SATURATION: 98 % | HEART RATE: 92 BPM | WEIGHT: 115 LBS | DIASTOLIC BLOOD PRESSURE: 85 MMHG

## 2023-05-08 PROCEDURE — 90791 PSYCH DIAGNOSTIC EVALUATION: CPT

## 2023-05-08 PROCEDURE — 250N000011 HC RX IP 250 OP 636: Performed by: EMERGENCY MEDICINE

## 2023-05-08 PROCEDURE — 250N000013 HC RX MED GY IP 250 OP 250 PS 637: Performed by: EMERGENCY MEDICINE

## 2023-05-08 RX ORDER — ONDANSETRON 4 MG/1
4 TABLET, ORALLY DISINTEGRATING ORAL ONCE
Status: COMPLETED | OUTPATIENT
Start: 2023-05-08 | End: 2023-05-08

## 2023-05-08 RX ORDER — DESOGESTREL AND ETHINYL ESTRADIOL 21-5 (28)
1 KIT ORAL DAILY
COMMUNITY

## 2023-05-08 RX ADMIN — NICOTINE POLACRILEX 2 MG: 2 GUM, CHEWING ORAL at 01:38

## 2023-05-08 RX ADMIN — ONDANSETRON 4 MG: 4 TABLET, ORALLY DISINTEGRATING ORAL at 01:39

## 2023-05-08 ASSESSMENT — COLUMBIA-SUICIDE SEVERITY RATING SCALE - C-SSRS
REASONS FOR IDEATION SINCE LAST CONTACT: DOES NOT APPLY
SUICIDE, SINCE LAST CONTACT: NO
TOTAL  NUMBER OF ABORTED OR SELF INTERRUPTED ATTEMPTS SINCE LAST CONTACT: NO
6. HAVE YOU EVER DONE ANYTHING, STARTED TO DO ANYTHING, OR PREPARED TO DO ANYTHING TO END YOUR LIFE?: NO
TOTAL  NUMBER OF INTERRUPTED ATTEMPTS SINCE LAST CONTACT: NO
1. SINCE LAST CONTACT, HAVE YOU WISHED YOU WERE DEAD OR WISHED YOU COULD GO TO SLEEP AND NOT WAKE UP?: YES
ATTEMPT SINCE LAST CONTACT: NO
2. HAVE YOU ACTUALLY HAD ANY THOUGHTS OF KILLING YOURSELF?: NO

## 2023-05-08 ASSESSMENT — ACTIVITIES OF DAILY LIVING (ADL)
ADLS_ACUITY_SCORE: 35

## 2023-05-08 NOTE — ED TRIAGE NOTES
Pt presents today through triage; pt C/O panic attacks that are increasing frequency and intensity. Pt denies SI/HI. Pt says onset was about a week ago but much worse today. Pt states she's been using alcohol as a coping mechanism.      Triage Assessment     Row Name 05/07/23 1944       Triage Assessment (Adult)    Airway WDL WDL       Respiratory WDL    Respiratory WDL WDL       Skin Circulation/Temperature WDL    Skin Circulation/Temperature WDL WDL       Cardiac WDL    Cardiac WDL WDL       Peripheral/Neurovascular WDL    Peripheral Neurovascular WDL WDL       Cognitive/Neuro/Behavioral WDL    Cognitive/Neuro/Behavioral WDL X  Anxiety       [place] Coma Scale    Best Eye Response 4-->(E4) spontaneous    Best Motor Response 6-->(M6) obeys commands    Best Verbal Response 5-->(V5) oriented    [place] Coma Scale Score 15

## 2023-05-08 NOTE — ED PROVIDER NOTES
The patient's care was signed out to me by Dr. Carrasco.  The plan was the should be evaluated by DEC once she is clinically sober.  The patient became clinically sober and requested to be discharged home.  I saw her and her breathalyzer was 0.00.  I spoke with her and she denies any suicidal thoughts and denies any plan for suicide.  She also denies homicidal thoughts or hallucinations.  She reports that she just made the comments that she made earlier because she was intoxicated but now that she is sober she has none of those thoughts.  I felt that she was safe for discharge.  She states she plans to have her father take her to [place] for chemical dependency intake to get back into treatment this afternoon.  She denies any history of alcohol withdrawal seizure.  I offered her DEC evaluation which she declines.  I offered to send her to [facility] which she declines.  I also offered to send her to detox to help with any potential withdrawal symptoms which she declines stating that she has withdrawn at home in the past without any problems and she does not feel that she is in any significant alcohol withdrawal.  She was told to return if she develops any alcohol withdrawal symptoms or concerns.  She states that her father is going to call her an Uber when she gets discharged.  She is upset that we have held her here for so long stating that she has come to the emergency department under similar circumstances in the past and has not been kept against her will.  I did not feel that we could hold her any longer since her 72-hour hold is .  To the best of my knowledge I felt she be safely discharged to home at this time.     Evelyn Hanna MD  [phone]

## 2023-05-08 NOTE — PLAN OF CARE
Olga Turner  May 8, 2023  Plan of Care Hand-off Note     Patient Care Path: Observation    Plan for Care:     Pt is currently in the ED at [facility]. Pt presents with worsening anxiety symptoms and alcohol intoxication. Pt states that anxiety symptoms have been increasing recently after completing a diagnostic assessment. She was experiencing a panic attack earlier today and drank a liter of alcohol to deal with her emotions. Pt states that she wants help for her mental health issues and withdrawal and wants inpatient help. Currently, pt does not appear to meet criteria for inpatient, however, collateral was not able to be obtained due to no answer. Pt is not an appropriate candidate for [facility] at this time due to testing positive for COVID when she got to the hospital. At the time of the assessment, recommendation is for pt to be on observation status and be reassessed in the morning, speak with collateral to determine disposition of discharge. Writer returned to speak with pt at 2:30 AM to discuss observation status and nurse reports her BAL is currently .13. Pt now states that she is hoping to get into [facility] for treatment. She understands that she may need to discharge home today to await acceptance and placement. Pt does not currently have a psychiatrist or therapist. She was recommended to Dual IOP on 5/3/23 by [facility] provider who completed DA, pt may benefit from following through with treatment to address both mental health and substance use issues.    Critical Safety Issues: worsening anxiety and alcohol intoxication    Overview:  This patient is a child/adolescent: No    This patient has additional special visitor precautions: No    Legal Status: EL    Contacts:   Miquel Marshall, boyfriend/emergency contact ([phone])  Seven Stallworth, pts father ([phone]) - pt did sign verbal TOMER for Seven    Psychiatry Consult:  Psychiatry Consult not requested because pt will be reassessed in the  AM. She has not been recommended to inpatient at this time.     Updated RN and Attending Provider regarding plan of care.    Rosalee Womack MA, LPCC, LADC

## 2023-05-08 NOTE — ED PROVIDER NOTES
History     Chief Complaint:  Mental Health Complaint     The history is provided by the patient.      Olga Turner is a 27 year old female with a history of alcohol dependence and LISA who presents seeking [facility]. The patient provides that recently, she has had increased alcohol use especially in the last 3-4 days where she was drinking everyday. Last drink was earlier today in the morning. Patient denies any history of withdrawal seizures or hallucinations. Additionally, the patient provides that she had around 4 panic attacks yesterday and recently has been having passive suicidal thoughts with no plan. Notes she has not taken her medications for the last 2 weeks. She does not follow with a therapist, counselor, psychologist, or psychiatrist for her mental health. Patient notes a possibility of pregnancy and is unsure of when her last menstrual period was. Denies any recent illnesses or symptoms such as fever or abdominal pain.    Independent Historian:   None - Patient Only    Review of External Notes: N/A    ROS:  Review of Systems   Constitutional: Negative for fever.   Gastrointestinal: Negative for abdominal pain.   Psychiatric/Behavioral: Positive for suicidal ideas.   All other systems reviewed and are negative.      Allergies:  Penicillin G  Erythromycin   Amoxicillin    Medications:    Buspar  Atarax  Levonorgestrel-ethinyl estradiol  Zoloft  Depade  Revia    Past Medical History:    Alcohol use disorder  Alcohol dependence  Anorexia nervosa  LISA  OCD    Past Surgical History:    The patient denies any prior surgical history.    Family History:    Anxiety disorder  Substance abuse  Hypertension  Sleep apnea    Social History:  Patient came from home.  Patient is unaccompanied in the ED.  Patient affirms alcohol use.  PCP: Tory Aden     Physical Exam     Patient Vitals for the past 24 hrs:   BP Temp Temp src Pulse Resp SpO2 Height Weight   05/08/23 0010 103/69 -- -- 92 16 98 % -- --  "  05/07/23 1918 134/87 98.8  F (37.1  C) Temporal 102 16 97 % 1.6 m (5' 3\") 52.2 kg (115 lb)        Physical Exam  General: Patient in mild distress.  Alert and cooperative with exam. Normal mentation  HEENT: NC/AT. Conjunctiva without injection or scleral icterus. External ears normal.  Respiratory: Breathing comfortably on room air  CV: Normal rate, all extremities well perfused  GI:  Non-distended abdomen  Skin: Warm, dry, no rashes/open wounds on exposed skin  Musculoskeletal: No obvious deformities  Neuro: Alert, answers questions appropriately. No gross motor deficits  Psychiatric: Endorses depression, passive suicidal ideation, and anxiety. Appears moderately under the influence. Inappropriate laughter.    Emergency Department Course     Laboratory:  Labs Ordered and Resulted from Time of ED Arrival to Time of ED Departure   BASIC METABOLIC PANEL - Abnormal       Result Value    Sodium 138      Potassium 3.4      Chloride 97 (*)     Carbon Dioxide (CO2) 23      Anion Gap 18 (*)     Urea Nitrogen 12.2      Creatinine 0.76      Calcium 9.5      Glucose 117 (*)     GFR Estimate >90     ETHYL ALCOHOL LEVEL - Abnormal    Alcohol ethyl 0.35 (*)    COVID-19 VIRUS (CORONAVIRUS) BY PCR - Abnormal    SARS CoV2 PCR Positive (*)    HCG QUALITATIVE PREGNANCY - Normal    hCG Serum Qualitative Negative         Emergency Department Course & Assessments:       Interventions:  Medications   nicotine (NICORETTE) gum 2 mg (2 mg Buccal $Given 5/8/23 0138)   ondansetron (ZOFRAN ODT) ODT tab 4 mg (4 mg Oral $Given 5/8/23 0139)        Assessments:  1909 I obtained history and examined the patient as noted above.    Independent Interpretation (X-rays, CTs, rhythm strip):  None    Consultations/Discussion of Management or Tests:  Patient's care was discussed with the DEC        Social Determinants of Health affecting care:   None    Disposition:  Signed out to my partner Dr. Carrasco pending clinical sobriety and repeat DEC " assessment early tomorrow morning    Impression & Plan      Medical Decision Making:  Patient is a 27-year-old female who presents with alcohol abuse, depression, and passive suicidal ideation as well as anxiety.  Patient's medical history and records were reviewed.  Basic labs were obtained and notable for negative pregnancy test and mildly elevated anion gap which is likely secondary to significantly elevated blood alcohol level (0.35).  Patient unfortunately tested positive for COVID though has no concerning COVID symptoms.  As result of positive COVID test she is not eligible for transfer to [facility] but rather was evaluated by DEC in the ED.  At this time it is not felt that patient needs inpatient mental health evaluation and from a mental health standpoint is safe for discharge.  She continues to be intoxicated and lives alone and does not have anybody available to pick her up or stay with her at this time.  Patient is unable to be transferred to detox due to current COVID-positive status.  Patient will board in the emergency department overnight with plan for repeat DEC assessment in the morning and likely discharge once clinically sober with outpatient resources.  At this time there is no clinical evidence of significant alcohol withdrawal.  Remained stable throughout my care.  Signed out to my partner Dr. Carrasco.    Diagnosis:    ICD-10-CM    1. Alcoholic intoxication without complication (H)  F10.920       2. COVID-19  U07.1       3. Depression, unspecified depression type  F32.A              Scribe Disclosure:  I, Rishi Martinez, am serving as a scribe at 7:31 PM on 5/7/2023 to document services personally performed by Oli Burton DO based on my observations and the provider's statements to me.       Oli Burton DO  05/08/23 9303

## 2023-05-08 NOTE — ED NOTES
"Pt is alert and oriented, able to hold conversation. Pt is frustrated that she cannot go to [facility] d/t being COVID positive. Pt denies feeling actively suicidal at this time. This RN said I would put her in line to speak with a therapist and pt said \"what happens after that, you're just going to let me fucking go onto the street like this? See I'm withdrawing, look at my hands!\" Pt hands slightly shaky. Pt was reminded that she was a 0.35 not long ago and is most likely not withdrawing. Pt clearly frustrated and again started swearing and saying she wants to call her dad. Phone at bedside. This RN told pt there is no reason to swear and to calm down. DEC soon at bedside to assess.   "

## 2023-05-08 NOTE — ED NOTES
Denies SI / HI. Pt requesting to go to [facility] where she has a bed available. Per pt her father can pick her up. Md Hanna updated

## 2023-05-08 NOTE — PHARMACY-ADMISSION MEDICATION HISTORY
Pharmacist Admission Medication History    Admission medication history is complete. The information provided in this note is only as accurate as the sources available at the time of the update.    Medication reconciliation/reorder completed by provider prior to medication history? No    Information Source(s): Patient, Hospital records and CareEverywhere/SureScripts via in-person    Pertinent Information: None    Changes made to PTA medication list:    Added: None    Deleted: None    Changed: Sertraline, Hydroxyzine, Birth control    Allergies reviewed with patient and updates made in EHR: yes    Medication History Completed By: Ankur Wolff [facility] 5/8/2023 10:43 AM    Prior to Admission medications    Medication Sig Last Dose Taking? Auth Provider Long Term End Date   busPIRone (BUSPAR) 10 MG tablet Take 1 tablet by mouth 2 times daily Past Week Yes Reported, Patient Yes    desogestrel-ethinyl estradiol (KARIVA) 0.15-0.02/0.01 MG (21/5) tablet Take 1 tablet by mouth daily Past Week Yes Unknown, Entered By History Yes    hydrOXYzine (ATARAX) 25 MG tablet Take 25 mg by mouth every 6 hours as needed for anxiety Unknown at prn Yes Reported, Patient     sertraline (ZOLOFT) 100 MG tablet Take 200 mg by mouth daily Past Week at AM Yes Reported, Patient Yes

## 2023-05-08 NOTE — CONSULTS
Diagnostic Evaluation Consultation  Crisis Assessment    Patient was assessed: In Person  Patient location: [facility] ED  Was a release of information signed: Yes. Providers included on the release: Pt signed verbal TOMER for her dad, Seven      Referral Data and Chief Complaint  Olga is a 27 year old, who uses she/her pronouns, and presents to the ED with family/friends. Patient is referred to the ED by self. Patient is presenting to the ED for the following concerns: increased anxiety symptoms, alcohol use.      Informed Consent and Assessment Methods     Patient is her own guardian. Writer met with patient and explained the crisis assessment process, including applicable information disclosures and limits to confidentiality, assessed understanding of the process, and obtained consent to proceed with the assessment. Patient was observed to be able to participate in the assessment as evidenced by answered questions appropriately. Assessment methods included conducting a formal interview with patient, review of medical records, collaboration with medical staff, and obtaining relevant collateral information from family and community providers when available..     Over the course of this crisis assessment provided reassurance, offered validation and engaged patient in problem solving and disposition planning. Patient's response to interventions was engaged during assessment. Pts BAL was .35 at 8:05 PM and writer started assessment at 12:19 AM, pt is likely still under the influence of alcohol to some degree.      Summary of Patient Situation    Pt reports that she presents to the ED tonight due to increase in anxiety symptoms and 'scary thoughts' in her head. She states that she has been feeling this way since doing the DA. (Pt was previously in the ED 2 weeks ago due to alcohol intoxication and went to detox from there. She was recommended to CD treatment, however, did not follow up with that. After  spending 3 days in detox, pt returned home and requested to have a DA completed to get a better idea of her mental health concerns. Pt completed the DA on 5/3/23 and was recommended to Dual IOP. At this time, pt has not followed through with recommendation). Pt states that she wanted to do the evaluation because her mom thinks there is something else going on with her mental health. Pt states that something about that assessment messed with her head and provoked anxiety symptoms. She isn't sure what triggered it. She states she has been 'bouncing back and forth' in her head. She has been experiencing panic attacks, recently has been experiencing 4-5 panic attacks per day. She states that the past few days has been having multiple panic attacks, got scared of herself, had a bad feeling and not feeling safe with herself. She reports that she doesn't have suicidal ideation. She states that she wouldn't take anything to not wake up. She states that her anxiety was too much today that she started drinking, then called her boyfriend and he brought her here to the ED [facility]. They were looking at mental health places to go and found [facility]. She is upset that due to testing positive for COVID she is unable to go to [facility] at this time. She started drinking a liter after having 'scary thoughts.' When writer asked her what scary thoughts meant, she stated that she was having thoughts of not wanting to wake up, she was adamant that she was not going to do anything and then also the anxiety of worrying about everything. Pt reports she has no history of SA. She reports her thoughts have been down, depressed feelings. She states she has had a thought, 'If I got hit by a bus that would be okay, but I don't want to jump in front of a bus.'    Pt states that she has been to treatment before about 1 year ago at [facility]. She states that she has also been to treatment in [place] in 2020. After [facility] she was sober for 4 months. She  states that after getting out of detox recently on 4/27/23 she was sober until earlier today.   She would be alone and scared and having panic attacks, things are getting worse in her head.     Brief Psychosocial History    Pt states that she lives by herself in an apartment. Her boyfriend is there a lot. She works full-time in HR. She has been on paid time off for the past few weeks because of the increase in anxiety symptoms. Her boyfriend, parents, sister and a few friends are supportive. No legal issues.     Significant Clinical History    Pt reports a history of anxiety, OCD, anorexia (age 17), PTSD. She states she does not have a current psychiatrist or therapist. Her PCP manages her medication. Pt has presented to the ED 3 times so far this year for anxiety or alcohol intoxication. No hosptializations. No history of commitment. Pt has had to substance abuse treatment 2x.      Collateral Information    Writer attempted to collect collateral from Miquel Carolinayeyo, boyfriend/emergency contact ([phone]), yet was unable to connect. Writer left a voicemail with instruction to call back to provide information.    Writer attempted to collect collateral from Seven Eatonliliana, pts father ([phone]) - pt did sign verbal TOMER for Seven, yet was unable to connect. Writer left a voicemail with instruction to call back to provide information.    Risk Assessment  [place] Suicide Severity Rating Scale (Lifetime/Recent)       5/3/2023     1:00 PM   [place] Suicide Severity Rating (Lifetime/Recent)   1. Wish to be Dead (Lifetime) N   2. Non-Specific Active Suicidal Thoughts (Lifetime) N   Actual Attempt (Lifetime) N   Has subject engaged in non-suicidal self-injurious behavior? (Lifetime) N   Interrupted Attempts (Lifetime) N   Aborted or Self-Interrupted Attempt (Lifetime) N   Preparatory Acts or Behavior (Lifetime) N   Calculated C-SSRS Risk Score (Lifetime/Recent) No Risk Indicated       [place] Suicide Severity Rating  Scale Full Clinical Version: see above (5/3/23)       Divide Suicide Severity Rating Scale Since Last Contact: 5/8/23  Suicidal Ideation (Since Last Contact)  1. Wish to be Dead (Since Last Contact): Yes  2. Non-Specific Active Suicidal Thoughts (Since Last Contact): No  Suicidal Behavior (Since Last Contact)  Actual Attempt (Since Last Contact): No  Has subject engaged in non-suicidal self-injurious behavior? (Since Last Contact): No  Interrupted Attempts (Since Last Contact): No  Aborted or Self-Interrupted Attempt (Since Last Contact): No  Preparatory Acts or Behavior (Since Last Contact): No  Suicide (Since Last Contact): No     C-SSRS Risk (Since Last Contact)  Calculated C-SSRS Risk Score (Since Last Contact): Low Risk    Validity of evaluation is not impacted by presenting factors during interview NA.   Comments regarding subjective versus objective responses to Divide tool: NA  Environmental or Psychosocial Events: challenging interpersonal relationships, geographic isolation from supports, barriers to accessing healthcare and other life stressors  Chronic Risk Factors: other: substance use   Warning Signs: talking or writing about death, dying, or suicide and hopelessness  Protective Factors: strong bond to family unit, community support, or employment, intact marriage or domestic partnership, lives in a responsibly safe and stable environment, help seeking, optimistic outlook - identification of future goals and constructive use of leisure time, enjoyable activities, resilience  Interpretation of Risk Scoring, Risk Mitigation Interventions and Safety Plan:  Pt states she started drinking a liter after having 'scary thoughts.' When writer asked her what scary thoughts meant, she stated that she was having thoughts of not wanting to wake up, she was adamant that she was not going to do anything and then also the anxiety of worrying about everything. Pt reports she has no history of SA. She reports her  "thoughts have been down, depressed feelings. She states she has had a thought, 'If I got hit by a bus that would be okay, but I don't want to jump in front of a bus.'     Does the patient have thoughts of harming others? No     Is the patient engaging in sexually inappropriate behavior?  no        Current Substance Abuse     Is there recent substance abuse? Yes    She will drink for 2 days and then stop for a few days. Pt has a significant history of alcohol use  Pt completed DA on 5/3/23. As part of the assessment, pt provided substance use history. See below for further information:           Substance History of use Age of first use Date of last use       Pattern and duration of use (include amounts and frequency)   Alcohol currently use    17 05/03/23 REPORTS SUBSTANCE USE: reports using substance 3 times per day and has 8 drinks at a time.   Patient reports heaviest use was intermittent periods of use but never longer than 5 days at a time.   Cannabis    used in the past 20 04/24/23 Patient reports using once in a \"blue moon\" and denies concerns.      Amphetamines    never used   REPORTS SUBSTANCE USE: N/A   Cocaine/crack     never used       REPORTS SUBSTANCE USE: N/A   Hallucinogens never used         REPORTS SUBSTANCE USE: N/A   Inhalants never used         REPORTS SUBSTANCE USE: N/A   Heroin never used         REPORTS SUBSTANCE USE: N/A   Other Opiates never used   REPORTS SUBSTANCE USE: N/A   Benzodiazepine    never used   REPORTS SUBSTANCE USE: N/A   Barbiturates never used   REPORTS SUBSTANCE USE: N/A   Over the counter meds never used   REPORTS SUBSTANCE USE: N/A   Caffeine currently use 17  5/3/23 REPORTS SUBSTANCE USE: reports using substance 1 times per day and has 1 . at a time.   Patient reports heaviest use is current use.   Nicotine  currently use 23 05/03/23 REPORTS SUBSTANCE USE: reports using substance 10 times per day and has 1 vape at a time.   Patient reports heaviest use is current use. "   Other substances not listed above:  Identify:  never used   REPORTS SUBSTANCE USE: N/A         Was a urine drug screen or blood alcohol level obtained: Yes BAL .35       Mental Status Exam     Affect: Other: irritable   Appearance: Appropriate    Attention Span/Concentration: Attentive  Eye Contact: Engaged   Fund of Knowledge: Appropriate    Language /Speech Content: Fluent   Language /Speech Volume: Normal    Language /Speech Rate/Productions: Normal    Recent Memory: Intact   Remote Memory: Intact   Mood: Irritable    Orientation to Person: Yes    Orientation to Place: Yes   Orientation to Time of Day: Yes    Orientation to Date: Yes    Situation (Do they understand why they are here?): Yes    Psychomotor Behavior: Normal    Thought Content: Clear   Thought Form: Intact      History of commitment: No    Medication    Psychotropic medications: Yes, see below    Current Facility-Administered Medications   Medication     nicotine (NICORETTE) gum 2 mg     Current Outpatient Medications   Medication     busPIRone (BUSPAR) 10 MG tablet     hydrOXYzine (ATARAX) 25 MG tablet     levonorgestrel-ethinyl estradiol (AVIANE,ALESSE,LESSINA) 0.1-20 MG-MCG per tablet     sertraline (ZOLOFT) 50 MG tablet       Medication changes made in the last two weeks: No       Current Care Team    Primary Care Provider: Dr. Lon Don, [facility]. He manages her medications  Psychiatrist: No  Therapist: No  : No     CTSS or ARMHS: No  ACT Team: No  Other: No      Diagnosis    300.00 (F41.9) Unspecified Anxiety Disorder   Substance-Related & Addictive Disorders 291.9 (F10.99) Unspecified Alcohol Related Disorder - by history     Clinical Summary and Substantiation of Recommendations    Pt is currently in the ED at [facility]. Pt presents with worsening anxiety symptoms and alcohol intoxication. Pt states that anxiety symptoms have been increasing recently after completing a diagnostic assessment. She was  experiencing a panic attack earlier today and drank a liter of alcohol to deal with her emotions. Pt states that she wants help for her mental health issues and withdrawal and wants inpatient help. Currently, pt does not appear to meet criteria for inpatient, however, collateral was not able to be obtained due to no answer. Pt is not an appropriate candidate for [facility] at this time due to testing positive for COVID when she got to the hospital. At the time of the assessment, recommendation is for pt to be on observation status and be reassessed in the morning, speak with collateral to determine appropriate disposition.   Writer returned to speak with pt at 2:30 AM to discuss observation status and nurse reports her BAL is currently .13. Pt now states that she is hoping to get into [facility] for treatment. She understands that she may need to discharge home today to await acceptance and placement. She did not speak of wanting inpatient at this time. Pt does not currently have a psychiatrist or therapist. She was recommended to Dual IOP on 5/3/23 by [place] provider who completed DA, pt may benefit from following through with treatment to address both mental health and substance use issues.  Disposition    Recommended disposition: observation status overnight, reassess in the morning to determine appropriate discharge disposition     Reviewed case and recommendations with attending provider. Attending Name: Dr. Burton       Attending concurs with disposition: Yes       Patient and/or validated legal guardian concurs with disposition: Yes       Final disposition: observation status overnight, reassess in the morning to determine appropriate discharge disposition    Inpatient Details (if applicable):   Is patient admitted voluntarily:NA      Patient aware of potential for transfer if there is not appropriate placement? NA       Patient is willing to travel outside of the [facility] for placement? NA      Behavioral Intake  Notified? NA     Outpatient Details (if applicable):   Aftercare plan and appointments placed in the AVS and provided to patient: No. Rationale: will be provided to pt at time of discharge    Was lethal means counseling provided as a part of aftercare planning? No;       Assessment Details    Patient interview started at: 12:19 AM and completed at: 1:22 AM.     Total duration spent on the patient case in minutes: 1.50 hrs      CPT code(s) utilized: 85402 - Psychotherapy for Crisis - 60 (30-74*) min and 08600 - Psychotherapy for Crisis (Each additional 30 minutes) - 30 min        Rosalee Womack MA, LPCC, LADC, Psychotherapist  DEC - Triage & Transition Services  Callback: [phone]

## 2023-05-08 NOTE — PROGRESS NOTES
Triage & Transition Services, Extended Care     Olga ALANIZ [facility]  May 8, 2023    Olga is followed related to observation status. Please see initial DEC Crisis Assessment completed for complete assessment information. Medical record is reviewed.  While patient is in the ED, care team is working towards Learn and Demonstrate at [employer] Focused on Crisis Stabilization.     Plan per initial assessment completed by Rosalee Womack on 5/8/23 around midnight this morning was for patient to sober and by re-assessed.  Patient had initially presented with a blood alcohol  of .35 at 7:25pm on 5/7/23 and initially assessed hours later.      Received request from  team to see patient.  Spoke with nursing who indicated patient was awaiting an initial.  Reviewed and initial had been completed with plan for reassessment today.  Made plans to see patient via telehealth.  Clinician called back when request did not come through.  Received report from nursing that patient does not want to be seen.  MD has ordered a breathalyzer and will let patient go if sober.  Inquired about safety planning with patient.  Received report patient refusing to meet so if it required meeting with patient, it is not needed. Noted that clinician would document the attempt.      Plan:  Discharge: Patient reported plans to go to [facility] where she reported having a bed.    Plan for Care reviewed with Assigned Medical Provider? No. As noted above.    Extended Care will follow and meet with patient/family/care team as able or requested.     Angelique Carballo  [facility], Extended Care   [phone]

## 2023-05-12 ENCOUNTER — TELEPHONE (OUTPATIENT)
Dept: BEHAVIORAL HEALTH | Facility: CLINIC | Age: 28
End: 2023-05-12
Payer: COMMERCIAL

## 2023-05-12 NOTE — TELEPHONE ENCOUNTER
LVM for pt re: DDP and pt's interest in the program and possible start date(s).  Provided writer's call back information.

## 2023-05-24 ENCOUNTER — TELEPHONE (OUTPATIENT)
Dept: BEHAVIORAL HEALTH | Facility: CLINIC | Age: 28
End: 2023-05-24
Payer: COMMERCIAL

## 2023-05-24 NOTE — TELEPHONE ENCOUNTER
LVM for pt re: DDP and requested a call back with pt's interest in program and to discuss potential start dates.  Provided writer's direct call back phone number.

## 2023-06-06 NOTE — ED NOTES
Accessed chart. Pt came to triage requesting AVS; attempted to print without success; referred pt back to business office

## 2023-06-16 ENCOUNTER — HOSPITAL ENCOUNTER (OUTPATIENT)
Facility: CLINIC | Age: 28
Setting detail: OBSERVATION
Discharge: HOME | End: 2023-06-18
Attending: EMERGENCY MEDICINE
Payer: COMMERCIAL

## 2023-06-16 DIAGNOSIS — F10.29 ALCOHOL DEPENDENCE WITH UNSPECIFIED ALCOHOL-INDUCED DISORDER (H): ICD-10-CM

## 2023-06-16 DIAGNOSIS — F12.10 MARIJUANA ABUSE: ICD-10-CM

## 2023-06-16 DIAGNOSIS — F42.9 OCD (OBSESSIVE COMPULSIVE DISORDER): ICD-10-CM

## 2023-06-16 DIAGNOSIS — F41.1 GAD (GENERALIZED ANXIETY DISORDER): ICD-10-CM

## 2023-06-16 DIAGNOSIS — F29 PSYCHOSIS, UNSPECIFIED PSYCHOSIS TYPE (H): ICD-10-CM

## 2023-06-16 PROCEDURE — 93005 ELECTROCARDIOGRAM TRACING: CPT

## 2023-06-16 PROCEDURE — 99285 EMERGENCY DEPT VISIT HI MDM: CPT | Mod: 25

## 2023-06-16 RX ORDER — FLUMAZENIL 0.1 MG/ML
0.2 INJECTION, SOLUTION INTRAVENOUS
Status: DISCONTINUED | OUTPATIENT
Start: 2023-06-16 | End: 2023-06-17

## 2023-06-16 RX ORDER — ONDANSETRON 2 MG/ML
4 INJECTION INTRAMUSCULAR; INTRAVENOUS EVERY 30 MIN PRN
Status: DISCONTINUED | OUTPATIENT
Start: 2023-06-16 | End: 2023-06-17

## 2023-06-16 RX ORDER — HALOPERIDOL 5 MG/ML
2.5-5 INJECTION INTRAMUSCULAR EVERY 6 HOURS PRN
Status: DISCONTINUED | OUTPATIENT
Start: 2023-06-16 | End: 2023-06-17

## 2023-06-16 RX ORDER — FOLIC ACID 1 MG/1
1 TABLET ORAL DAILY
Status: DISCONTINUED | OUTPATIENT
Start: 2023-06-16 | End: 2023-06-18 | Stop reason: HOSPADM

## 2023-06-16 RX ORDER — SODIUM CHLORIDE 9 MG/ML
INJECTION, SOLUTION INTRAVENOUS CONTINUOUS
Status: DISCONTINUED | OUTPATIENT
Start: 2023-06-17 | End: 2023-06-17

## 2023-06-16 RX ORDER — DIAZEPAM 5 MG
10 TABLET ORAL EVERY 30 MIN PRN
Status: DISCONTINUED | OUTPATIENT
Start: 2023-06-16 | End: 2023-06-17

## 2023-06-16 RX ORDER — DIAZEPAM 10 MG/2ML
5-10 INJECTION, SOLUTION INTRAMUSCULAR; INTRAVENOUS EVERY 30 MIN PRN
Status: DISCONTINUED | OUTPATIENT
Start: 2023-06-16 | End: 2023-06-17

## 2023-06-16 RX ORDER — MULTIPLE VITAMINS W/ MINERALS TAB 9MG-400MCG
1 TAB ORAL DAILY
Status: DISCONTINUED | OUTPATIENT
Start: 2023-06-16 | End: 2023-06-18 | Stop reason: HOSPADM

## 2023-06-16 RX ORDER — OLANZAPINE 5 MG/1
5-10 TABLET, ORALLY DISINTEGRATING ORAL EVERY 6 HOURS PRN
Status: DISCONTINUED | OUTPATIENT
Start: 2023-06-16 | End: 2023-06-17

## 2023-06-17 PROBLEM — F10.29 ALCOHOL DEPENDENCE WITH UNSPECIFIED ALCOHOL-INDUCED DISORDER (H): Status: ACTIVE | Noted: 2023-06-17

## 2023-06-17 PROBLEM — F29 PSYCHOSIS, UNSPECIFIED PSYCHOSIS TYPE (H): Status: ACTIVE | Noted: 2023-06-17

## 2023-06-17 PROBLEM — F41.1 GAD (GENERALIZED ANXIETY DISORDER): Status: ACTIVE | Noted: 2023-06-17

## 2023-06-17 PROBLEM — F42.9 OCD (OBSESSIVE COMPULSIVE DISORDER): Status: ACTIVE | Noted: 2023-06-17

## 2023-06-17 LAB
ALBUMIN SERPL BCG-MCNC: 4.2 G/DL (ref 3.5–5.2)
ALP SERPL-CCNC: 76 U/L (ref 35–104)
ALT SERPL W P-5'-P-CCNC: 22 U/L (ref 0–50)
AMPHETAMINES UR QL SCN: ABNORMAL
ANION GAP SERPL CALCULATED.3IONS-SCNC: 13 MMOL/L (ref 7–15)
AST SERPL W P-5'-P-CCNC: 50 U/L (ref 0–45)
ATRIAL RATE - MUSE: 78 BPM
BARBITURATES UR QL SCN: ABNORMAL
BASOPHILS # BLD AUTO: 0.1 10E3/UL (ref 0–0.2)
BASOPHILS NFR BLD AUTO: 1 %
BENZODIAZ UR QL SCN: ABNORMAL
BILIRUB SERPL-MCNC: 0.3 MG/DL
BUN SERPL-MCNC: 15 MG/DL (ref 6–20)
BZE UR QL SCN: ABNORMAL
CALCIUM SERPL-MCNC: 9.3 MG/DL (ref 8.6–10)
CANNABINOIDS UR QL SCN: ABNORMAL
CHLORIDE SERPL-SCNC: 101 MMOL/L (ref 98–107)
CREAT SERPL-MCNC: 0.6 MG/DL (ref 0.51–0.95)
DEPRECATED HCO3 PLAS-SCNC: 23 MMOL/L (ref 22–29)
DIASTOLIC BLOOD PRESSURE - MUSE: NORMAL MMHG
EOSINOPHIL # BLD AUTO: 0.1 10E3/UL (ref 0–0.7)
EOSINOPHIL NFR BLD AUTO: 1 %
ERYTHROCYTE [DISTWIDTH] IN BLOOD BY AUTOMATED COUNT: 14.3 % (ref 10–15)
ETHANOL SERPL-MCNC: <0.01 G/DL
GFR SERPL CREATININE-BSD FRML MDRD: >90 ML/MIN/1.73M2
GLUCOSE SERPL-MCNC: 100 MG/DL (ref 70–99)
HCG SERPL QL: NEGATIVE
HCG UR QL: NEGATIVE
HCT VFR BLD AUTO: 37.3 % (ref 35–47)
HGB BLD-MCNC: 12.6 G/DL (ref 11.7–15.7)
IMM GRANULOCYTES # BLD: 0 10E3/UL
IMM GRANULOCYTES NFR BLD: 0 %
INTERPRETATION ECG - MUSE: NORMAL
LYMPHOCYTES # BLD AUTO: 3.3 10E3/UL (ref 0.8–5.3)
LYMPHOCYTES NFR BLD AUTO: 31 %
MAGNESIUM SERPL-MCNC: 2.4 MG/DL (ref 1.7–2.3)
MCH RBC QN AUTO: 30.7 PG (ref 26.5–33)
MCHC RBC AUTO-ENTMCNC: 33.8 G/DL (ref 31.5–36.5)
MCV RBC AUTO: 91 FL (ref 78–100)
MONOCYTES # BLD AUTO: 1.6 10E3/UL (ref 0–1.3)
MONOCYTES NFR BLD AUTO: 15 %
NEUTROPHILS # BLD AUTO: 5.6 10E3/UL (ref 1.6–8.3)
NEUTROPHILS NFR BLD AUTO: 52 %
NRBC # BLD AUTO: 0 10E3/UL
NRBC BLD AUTO-RTO: 0 /100
OPIATES UR QL SCN: ABNORMAL
P AXIS - MUSE: 71 DEGREES
PCP QUAL URINE (ROCHE): ABNORMAL
PLATELET # BLD AUTO: 231 10E3/UL (ref 150–450)
POTASSIUM SERPL-SCNC: 3.4 MMOL/L (ref 3.4–5.3)
PR INTERVAL - MUSE: 184 MS
PROT SERPL-MCNC: 7.3 G/DL (ref 6.4–8.3)
QRS DURATION - MUSE: 94 MS
QT - MUSE: 382 MS
QTC - MUSE: 435 MS
R AXIS - MUSE: 81 DEGREES
RBC # BLD AUTO: 4.1 10E6/UL (ref 3.8–5.2)
SARS-COV-2 RNA RESP QL NAA+PROBE: NEGATIVE
SODIUM SERPL-SCNC: 137 MMOL/L (ref 136–145)
SYSTOLIC BLOOD PRESSURE - MUSE: NORMAL MMHG
T AXIS - MUSE: 64 DEGREES
TSH SERPL DL<=0.005 MIU/L-ACNC: 2.28 UIU/ML (ref 0.3–4.2)
VENTRICULAR RATE- MUSE: 78 BPM
WBC # BLD AUTO: 10.7 10E3/UL (ref 4–11)

## 2023-06-17 PROCEDURE — 85025 COMPLETE CBC W/AUTO DIFF WBC: CPT | Performed by: EMERGENCY MEDICINE

## 2023-06-17 PROCEDURE — 36415 COLL VENOUS BLD VENIPUNCTURE: CPT | Performed by: EMERGENCY MEDICINE

## 2023-06-17 PROCEDURE — 80307 DRUG TEST PRSMV CHEM ANLYZR: CPT | Performed by: EMERGENCY MEDICINE

## 2023-06-17 PROCEDURE — 96361 HYDRATE IV INFUSION ADD-ON: CPT

## 2023-06-17 PROCEDURE — 99223 1ST HOSP IP/OBS HIGH 75: CPT

## 2023-06-17 PROCEDURE — 83735 ASSAY OF MAGNESIUM: CPT | Performed by: EMERGENCY MEDICINE

## 2023-06-17 PROCEDURE — 90791 PSYCH DIAGNOSTIC EVALUATION: CPT

## 2023-06-17 PROCEDURE — 96360 HYDRATION IV INFUSION INIT: CPT

## 2023-06-17 PROCEDURE — 87635 SARS-COV-2 COVID-19 AMP PRB: CPT | Performed by: EMERGENCY MEDICINE

## 2023-06-17 PROCEDURE — 81025 URINE PREGNANCY TEST: CPT | Performed by: STUDENT IN AN ORGANIZED HEALTH CARE EDUCATION/TRAINING PROGRAM

## 2023-06-17 PROCEDURE — G0378 HOSPITAL OBSERVATION PER HR: HCPCS

## 2023-06-17 PROCEDURE — 80053 COMPREHEN METABOLIC PANEL: CPT | Performed by: EMERGENCY MEDICINE

## 2023-06-17 PROCEDURE — 84703 CHORIONIC GONADOTROPIN ASSAY: CPT | Performed by: EMERGENCY MEDICINE

## 2023-06-17 PROCEDURE — 258N000003 HC RX IP 258 OP 636: Performed by: EMERGENCY MEDICINE

## 2023-06-17 PROCEDURE — 82077 ASSAY SPEC XCP UR&BREATH IA: CPT | Performed by: EMERGENCY MEDICINE

## 2023-06-17 PROCEDURE — 250N000013 HC RX MED GY IP 250 OP 250 PS 637: Performed by: EMERGENCY MEDICINE

## 2023-06-17 PROCEDURE — 250N000013 HC RX MED GY IP 250 OP 250 PS 637

## 2023-06-17 PROCEDURE — 84443 ASSAY THYROID STIM HORMONE: CPT | Performed by: EMERGENCY MEDICINE

## 2023-06-17 RX ORDER — ONDANSETRON 4 MG/1
4 TABLET, ORALLY DISINTEGRATING ORAL EVERY 6 HOURS PRN
Status: DISCONTINUED | OUTPATIENT
Start: 2023-06-17 | End: 2023-06-18 | Stop reason: HOSPADM

## 2023-06-17 RX ORDER — DESOGESTREL AND ETHINYL ESTRADIOL 21-5 (28)
1 KIT ORAL AT BEDTIME
Status: DISCONTINUED | OUTPATIENT
Start: 2023-06-17 | End: 2023-06-18 | Stop reason: HOSPADM

## 2023-06-17 RX ORDER — LANOLIN ALCOHOL/MO/W.PET/CERES
3 CREAM (GRAM) TOPICAL
Status: DISCONTINUED | OUTPATIENT
Start: 2023-06-17 | End: 2023-06-18 | Stop reason: HOSPADM

## 2023-06-17 RX ORDER — ACETAMINOPHEN 325 MG/1
650 TABLET ORAL EVERY 4 HOURS PRN
Status: DISCONTINUED | OUTPATIENT
Start: 2023-06-17 | End: 2023-06-18 | Stop reason: HOSPADM

## 2023-06-17 RX ORDER — TRAZODONE HYDROCHLORIDE 50 MG/1
50 TABLET, FILM COATED ORAL
Status: DISCONTINUED | OUTPATIENT
Start: 2023-06-17 | End: 2023-06-18 | Stop reason: HOSPADM

## 2023-06-17 RX ORDER — HYDROXYZINE HYDROCHLORIDE 50 MG/1
50 TABLET, FILM COATED ORAL EVERY 6 HOURS PRN
Status: DISCONTINUED | OUTPATIENT
Start: 2023-06-17 | End: 2023-06-18 | Stop reason: HOSPADM

## 2023-06-17 RX ORDER — BUSPIRONE HYDROCHLORIDE 10 MG/1
10 TABLET ORAL 2 TIMES DAILY
Status: DISCONTINUED | OUTPATIENT
Start: 2023-06-17 | End: 2023-06-18 | Stop reason: HOSPADM

## 2023-06-17 RX ORDER — OLANZAPINE 5 MG/1
5-10 TABLET, ORALLY DISINTEGRATING ORAL 2 TIMES DAILY PRN
Status: DISCONTINUED | OUTPATIENT
Start: 2023-06-17 | End: 2023-06-18 | Stop reason: HOSPADM

## 2023-06-17 RX ORDER — NALTREXONE HYDROCHLORIDE 50 MG/1
50 TABLET, FILM COATED ORAL DAILY
COMMUNITY

## 2023-06-17 RX ORDER — OLANZAPINE 5 MG/1
5 TABLET, ORALLY DISINTEGRATING ORAL AT BEDTIME
Status: DISCONTINUED | OUTPATIENT
Start: 2023-06-17 | End: 2023-06-18 | Stop reason: HOSPADM

## 2023-06-17 RX ADMIN — BUSPIRONE HYDROCHLORIDE 10 MG: 10 TABLET ORAL at 20:31

## 2023-06-17 RX ADMIN — MULTIPLE VITAMINS W/ MINERALS TAB 1 TABLET: TAB at 00:25

## 2023-06-17 RX ADMIN — DIAZEPAM 10 MG: 5 TABLET ORAL at 00:21

## 2023-06-17 RX ADMIN — THIAMINE HCL TAB 100 MG 100 MG: 100 TAB at 00:24

## 2023-06-17 RX ADMIN — FOLIC ACID 1 MG: 1 TABLET ORAL at 00:24

## 2023-06-17 RX ADMIN — SODIUM CHLORIDE 1000 ML: 9 INJECTION, SOLUTION INTRAVENOUS at 00:14

## 2023-06-17 RX ADMIN — SODIUM CHLORIDE: 9 INJECTION, SOLUTION INTRAVENOUS at 02:00

## 2023-06-17 RX ADMIN — HYDROXYZINE HYDROCHLORIDE 50 MG: 50 TABLET, FILM COATED ORAL at 16:46

## 2023-06-17 RX ADMIN — OLANZAPINE 5 MG: 5 TABLET, ORALLY DISINTEGRATING ORAL at 20:32

## 2023-06-17 ASSESSMENT — ACTIVITIES OF DAILY LIVING (ADL)
ADLS_ACUITY_SCORE: 35

## 2023-06-17 ASSESSMENT — COLUMBIA-SUICIDE SEVERITY RATING SCALE - C-SSRS
TOTAL  NUMBER OF ABORTED OR SELF INTERRUPTED ATTEMPTS SINCE LAST CONTACT: NO
LETHALITY/MEDICAL DAMAGE CODE MOST LETHAL ACTUAL ATTEMPT: NO PHYSICAL DAMAGE OR VERY MINOR PHYSICAL DAMAGE
LETHALITY/MEDICAL DAMAGE CODE MOST LETHAL POTENTIAL ATTEMPT: BEHAVIOR NOT LIKELY TO RESULT IN INJURY
1. SINCE LAST CONTACT, HAVE YOU WISHED YOU WERE DEAD OR WISHED YOU COULD GO TO SLEEP AND NOT WAKE UP?: NO
ATTEMPT SINCE LAST CONTACT: NO
6. HAVE YOU EVER DONE ANYTHING, STARTED TO DO ANYTHING, OR PREPARED TO DO ANYTHING TO END YOUR LIFE?: NO
2. HAVE YOU ACTUALLY HAD ANY THOUGHTS OF KILLING YOURSELF?: NO
SUICIDE, SINCE LAST CONTACT: NO
TOTAL  NUMBER OF INTERRUPTED ATTEMPTS SINCE LAST CONTACT: NO

## 2023-06-17 NOTE — ED NOTES
Writer spoke w/ RN AMERICA regarding pt's readiness for [facility]. AMERICA informed writer pt woke up briefly then went back to sleep; is still confused. Writer requested AMERICA or other ED staff call [facility] once pt is ready to be transferred to [facility].     DAT Tovar  DEC - Triage & Transition Services  Callback: [phone]

## 2023-06-17 NOTE — CONSULTS
"Diagnostic Evaluation Consultation  Crisis Assessment    Patient was assessed: In Person  Patient location:  ED  Was a release of information signed: No. Reason: Unable to participate       Referral Data and Chief Complaint  Olga, \"Eleanor\" is a 27 year old, who uses she/her pronouns, and presents to the ED with family/friends. Patient is referred to the ED by community provider(s). Patient is presenting to the ED for the following concerns: Alcohol withdrawal, auditory / visual hallucinations, & paranoia.    Informed Consent and Assessment Methods  Patient is her own guardian. Writer met with patient and explained the crisis assessment process, including applicable information disclosures and limits to confidentiality, assessed understanding of the process, and obtained consent to proceed with the assessment. Patient was observed to be able to participate in the assessment as evidenced by acknowledgement. Assessment methods included conducting a formal interview with patient, review of medical records, collaboration with medical staff, and obtaining relevant collateral information from family and community providers when available.    Over the course of this crisis assessment writer obtained assessment from collateral due to pt likely still intoxicated at time of assessment. . Patient's response to interventions was unable to be assessed due to withdrawal / sleepiness.     Summary of Patient Situation  Patient arrived via a family friend due to alcohol intoxication and auditory/visual hallucinations. Patient was reportedly \"wandering around [address]\" today and was \"yelling at strangers\" because she reportedly thought they were coming after her. The patient was on the phone with her parents at the time, who called a family friend, who arrived and called MARTY. COPE recommended pt go to ED. Pt has been reportedly experiencing auditory / visual hallucinations and paranoia, likely due to withdrawal.     Entirety " of assessment was completed from collateral contact & discussion w/ MD due to pt being unrousable throughout assessment. Pt briefly woke up to writer's voice in assessment, however was confused, notably was significantly sweating. Pt then fell back asleep.    Brief Psychosocial History  Per collateral: Patient currently lives alone in an apartment. Pt lost her job a few days ago. Pt was reportedly on leave from February - May. Patient's parents live in [place]. Unsure if pt is in relationship at this time. Pt's parents are supportive however reportedly consume a lot of alcohol.    Significant Clinical History  Per collateral: Patient has a history of anxiety, depression, OCD, and anorexia. Current ED visit is 4th visit related to alcohol intoxication / withdrawal. Pt has been reportedly consuming approximately 1-1.75L of liq our daily, aside from past 6 days. Has had bouts of sobriety. Pt also consumes marijuaua, amount / frequency unknown at this time. Pt has been to CD treatment 3-4 times ([facility] most recently, treatment center in [place], treatment center in [place]). Pt reportedly did not complete [facility] treatment. Reportedly went to couples treatment in [place] w/ another pt she met at [facility]. Pt was most recently in detox 4/24-4/27/23. No history of commitment. Legal issues involve a DUI from high school.     Collateral Information  Writer obtained collateral from a family friend who has known pt entirety of her life: Courtney. Used collateral to complete assessment until pt is able to be roused / alert / oriented.      Risk Assessment  [place] Suicide Severity Rating Scale Since Last Contact: 5/8/23  Suicidal Ideation (Since Last Contact)  1. Wish to be Dead (Since Last Contact): No  2. Non-Specific Active Suicidal Thoughts (Since Last Contact): No  Suicidal Behavior (Since Last Contact)  Actual Attempt (Since Last Contact): No  Has subject engaged in non-suicidal self-injurious behavior? (Since Last Contact):  No  Interrupted Attempts (Since Last Contact): No  Aborted or Self-Interrupted Attempt (Since Last Contact): No  Preparatory Acts or Behavior (Since Last Contact): No  Suicide (Since Last Contact): No  Actual/Potential Lethality (Most Lethal Attempt)  Actual Lethality/Medical Damage Code (Most Lethal Attempt): No physical damage or very minor physical damage  Potential Lethality Code (Most Lethal Attempt): Behavior not likely to result in injury  C-SSRS Risk (Since Last Contact)  Calculated C-SSRS Risk Score (Since Last Contact): No Risk Indicated    Validity of evaluation is impacted by presenting factors during interview as pt was unrousable throughout assessment.   Environmental or Psychosocial Events: challenging interpersonal relationships, geographic isolation from supports, barriers to accessing healthcare and other life stressors  Chronic Risk Factors: other: substance use   Warning Signs: talking or writing about death, dying, or suicide and hopelessness  Protective Factors: strong bond to family unit, community support, or employment, intact marriage or domestic partnership, lives in a responsibly safe and stable environment, help seeking, optimistic outlook - identification of future goals and constructive use of leisure time, enjoyable activities, resilience  Interpretation of Risk Scoring, Risk Mitigation Interventions and Safety Plan:  Writer unable to obtain an accurate columbia assessment at this time d/t pt being unrousable and confused. Per collateral, pt has made suicidal comments when intoxicated / consuming alcohol. An LMHP will obtain an accurate columbia assessment when pt is awake / alert / more oriented to assess risk and safety of pt.     Does the patient have thoughts of harming others? No - however unable to properly assess d/t pt's condition at this time.     Is the patient engaging in sexually inappropriate behavior?  no - however unable to properly assess d/t pt's condition at this  time.     Current Substance Abuse  Is there recent substance abuse? Yes ETOH (last use 6 days ago) and cannabis     Was a urine drug screen or blood alcohol level obtained: Yes positive for cannabis    Mental Status Exam   Affect: Flat and Other: sleeping / unrousable   Appearance: Disheveled    Attention Span/Concentration: Other: sleeping / unrousable   Eye Contact: Other: sleeping / unrousable    Fund of Knowledge: Other: sleeping / unrousable     Language /Speech Content: Other: sleeping / unrousable    Language /Speech Volume: Other: sleeping / unrousable     Language /Speech Rate/Productions: sleeping / unrousable     Recent Memory: Other: sleeping / unrousable    Remote Memory: Other: sleeping / unrousable    Mood: Other: Confused & sleeping / unrousable      Orientation to Person: Yes    Orientation to Place: Yes   Orientation to Time of Day: No    Orientation to Date: Yes    Situation (Do they understand why they are here?): Yes    Psychomotor Behavior: Underactive and Other: sleeping / unrousable      Thought Content: Other: Confused & sleeping / unrousable     Thought Form: Other: sleeping / unrousable        History of commitment: No    Medication  Psychotropic medications: Per chart review: Zoloft, Vistaril, and Buspar, Hydroxyzine  Medication changes made in the last two weeks: Yes: Has only been taking Hydroxyzine past week per MD    Current Care Team  Primary Care Provider: Dr. Lon Don, [facility]. He manages her medications  Psychiatrist: No  Therapist: Unsure at this time d/t pt being unrousable   : No     CTSS or ARMHS: No  ACT Team: No  Other: No    Diagnosis  300.00 (F41.9) Unspecified Anxiety Disorder - by history  Substance-Related & Addictive Disorders 291.9 (F10.99) Unspecified Alcohol Related Disorder - by history     Clinical Summary and Substantiation of Recommendations    A lower level of care has been unsuccessful in treating and stabilizing patient s mental  health symptoms. Patient will remain on [facility] unit under observation for continued monitoring, treatment and therapeutic intervention of mental health symptoms. Observation at [facility] could help mitigate the need for a more restrictive level of care in an inpatient setting. An LMHP will follow, working to address / discuss symptoms related to alcoholism, current life stressors, and ensure columbia assessment is completed to assess pt's risk of safety.   Due to pt's current state of confusion and inability to remain awake / oriented, pt will remain in ED for further observation until alert enough to go to [facility], with pt's consent.  Disposition  Recommended disposition: [facility] when awake / oriented if pt provides consent     Reviewed case and recommendations with attending provider. Attending Name: MD Nichole       Attending concurs with disposition: Yes       Patient and/or validated legal guardian concurs with disposition: No: Unrousable at this time      Final disposition: Observation in ED until pt is oriented enough to come to [facility] (with patient consent).    Assessment Details  Patient interview started at: 12:45AM and completed at: 1:30AM.     Total duration spent on the patient case in minutes: .50 hrs      CPT code(s) utilized: 95797 - Psychotherapy for Crisis - 60 (30-74*) min     DAT Sesay, Psychotherapist  [facility] - Triage & Transition Services  Callback: [phone]

## 2023-06-17 NOTE — ED NOTES
DEC wants phone number of the family friend but she left.  Tried to wake up the patient but so sleepy,no signs of respiratory distress.

## 2023-06-17 NOTE — ED PROVIDER NOTES
EmPATH Unit - Initial Psychiatric Observation Note  [facility] Emergency Department  Observation Initiation Date: Jun 16, 2023    Olga Turner MRN: [id number]   Age: 27 year old YOB: 1995     History     Chief Complaint   Patient presents with     Drug / Alcohol Assessment     Hallucinations     HPI  Olga Turner is a 27 year old female with a past history notable for anxiety, OCD, and alcohol dependence who presented to the emergency department with hallucinations in the context of recent alcohol consumption 6 days prior. In the emergency department, Eleanor was determined to be medically stable and transferred to the [facility] unit for psychiatric assessment.  Record review indicates a history of substance use disorder treatments, 3-4 treatments, most recent treatment was in March 2023 at [facility]. Patient reports recent detox stay 4/24-4/27/23 and a DUI in high school. They have currently been in the emergency department for 19 hours.     On examination today, Eleanor reports that she last drank 7 days ago and that over the past week she's been experiencing visual and auditory hallucinations. When she drinks, the most she will consume is 1 liter of alcohol per day, she notes that she has had periods of sobriety. Most recently she was smoking marijuana more and drinking less, she estimates a half liter per day. She reports that she's hearing people calling her name and music playing. She sees people who aren't there and thinks that people are trying to break into her apartment. She reports that at times she's held the door closed and ripped her nails trying to protect herself from people breaking in. She reports that her friends and parents have told her that she's been walking up and down the halls of her apartment complex yelling and screaming earlier this week, she does not recall this. She denies having withdrawal symptoms in the past and has not previously experienced hallucinations or  psychosis. She reports she used cannabis with a friend a week ago and that she's unsure if it was laced with something. She has used cannabis in the past but reports that she usually purchases it at a dispensary. She denies suicidal and homicidal ideation. She denies león. She reports and increase in anxiety surrounding the hallucinations and is fearful. She came to the emergency room for help since she hasn't experienced this before. She also reports that she has not been taking her medications consistently for the past month due to relapsing on alcohol. When sober she feels as though Zoloft and buspar have been very helpful for her anxiety. She has not taken either for at least one week and prior to that would miss numerous days each week when drinking. She has not found naltrexone to be helpful. She would like to restart zoloft and buspar. Discussed trying Zyprexa at bedtime to further target symptoms of psychosis including hallucinations and paranoia, discussed risk vs benefits, Eleanor would like to try Zyprexa. Eleanor would like to remain at [facility] under observation while restarting medications and then desires to return to [facility] treatment. She was last at [facility] in March and left the program. She would like to return there if able.     Past Medical History  No past medical history on file.  No past surgical history on file.  busPIRone (BUSPAR) 10 MG tablet  desogestrel-ethinyl estradiol (KARIVA) 0.15-0.02/0.01 MG (21/5) tablet  hydrOXYzine (ATARAX) 25 MG tablet  naltrexone (DEPADE/REVIA) 50 MG tablet  sertraline (ZOLOFT) 100 MG tablet      Allergies   Allergen Reactions     Penicillin G Other (See Comments)     amoxicillin: rash    May use PCN/ceph-NO amox/amp     Erythromycin GI Disturbance and Rash     Family History  Family History   Problem Relation Age of Onset     Anxiety Disorder Mother      Substance Abuse Father      Substance Abuse Paternal Grandfather      Social History   Social History     Tobacco  "Use     Smoking status: Never     Smokeless tobacco: Never   Substance Use Topics     Alcohol use: Yes     Comment: occ     Drug use: No      Past medical history, past surgical history, medications, allergies, family history, and social history were reviewed with the patient. No additional pertinent items.       Review of Systems  A medically appropriate review of systems was performed with pertinent positives and negatives noted in the HPI, and all other systems negative.    Physical Examination   BP: 128/89  Pulse: 114  Temp: 98.5  F (36.9  C)  Resp: 16  Height: 160 cm (5' 3\")  Weight: 52.2 kg (115 lb)  SpO2: 100 %    Physical Exam  General: Appears stated age.   Neuro: Alert and fully oriented. Extremities appear to demonstrate normal strength on visual inspection.   Integumentary/Skin: no rash visualized, normal color    Psychiatric Examination   Appearance: awake, alert, adequately groomed and dressed in hospital scrubs  Attitude:  cooperative  Eye Contact:  good  Mood:  anxious  Affect:  mood congruent  Speech:  clear, coherent and normal prosody  Psychomotor Behavior:  no evidence of tardive dyskinesia, dystonia, or tics and intact station, gait and muscle tone  Thought Process:  goal oriented  Associations:  no loose associations  Thought Content:  no evidence of suicidal ideation or homicidal ideation, auditory hallucinations present and visual hallucinations present  Insight:  good  Judgement:  fair  Oriented to:  time, person, and place  Attention Span and Concentration:  intact  Recent and Remote Memory:  intact  Language: able to name/identify objects without impairment  Fund of Knowledge: intact with awareness of current and past events    ED Course        Labs Ordered and Resulted from Time of ED Arrival to Time of ED Departure   COMPREHENSIVE METABOLIC PANEL - Abnormal       Result Value    Sodium 137      Potassium 3.4      Chloride 101      Carbon Dioxide (CO2) 23      Anion Gap 13      Urea " Nitrogen 15.0      Creatinine 0.60      Calcium 9.3      Glucose 100 (*)     Alkaline Phosphatase 76      AST 50 (*)     ALT 22      Protein Total 7.3      Albumin 4.2      Bilirubin Total 0.3      GFR Estimate >90     MAGNESIUM - Abnormal    Magnesium 2.4 (*)    CBC WITH PLATELETS AND DIFFERENTIAL - Abnormal    WBC Count 10.7      RBC Count 4.10      Hemoglobin 12.6      Hematocrit 37.3      MCV 91      MCH 30.7      MCHC 33.8      RDW 14.3      Platelet Count 231      % Neutrophils 52      % Lymphocytes 31      % Monocytes 15      % Eosinophils 1      % Basophils 1      % Immature Granulocytes 0      NRBCs per 100 WBC 0      Absolute Neutrophils 5.6      Absolute Lymphocytes 3.3      Absolute Monocytes 1.6 (*)     Absolute Eosinophils 0.1      Absolute Basophils 0.1      Absolute Immature Granulocytes 0.0      Absolute NRBCs 0.0     DRUG ABUSE SCREEN 77 URINE (FL, RH, SH) - Abnormal    Amphetamines Urine Screen Negative      Barbituates Urine Screen Negative      Benzodiazepine Urine Screen Negative      Cannabinoids Urine Screen Positive (*)     Opiates Urine Screen Negative      PCP Urine Screen Negative      Cocaine Urine Screen Negative     COVID-19 VIRUS (CORONAVIRUS) BY PCR - Normal    SARS CoV2 PCR Negative     ETHYL ALCOHOL LEVEL - Normal    Alcohol ethyl <0.01     TSH WITH FREE T4 REFLEX - Normal    TSH 2.28     HCG QUALITATIVE PREGNANCY - Normal    hCG Serum Qualitative Negative     HCG QUALITATIVE URINE - Normal    hCG Urine Qualitative Negative         Assessments & Plan (with Medical Decision Making)   Patient presenting with psychosis in the context of recent alcohol relapse, cannabis use, and medication nonadherence. Previous diagnosis of OCD, LISA, and alcohol abuse. Treatment plan focused on restarting medications to further target anxiety and psychosis. Will restart Zoloft at lower dose given recent medication nonadherence as patient has found this helpful when sober to manage anxiety. Start  Zyprexa targeting paranoia and hallucinations. Will continue to monitor to determine if psychosis is substance induced. Referral to substance use disorder treatment upon discharge. Last drink was over 7 days ago at time of assessment, no prior history of seizures, CIWA discontinued. Nursing notes reviewed noting no acute issues.     I have reviewed the assessment completed by the [facility].     During the observation period, the patient did not require medications for agitation, and did not require restraints/seclusion for patient and/or provider safety.     The patient was found to have a psychiatric condition that would benefit from an observation stay in the emergency department for further psychiatric stabilization and/or coordination of a safe disposition. The observation plan includes serial assessments of psychiatric condition, potential administration of medications if indicated, further disposition pending the patient's psychiatric course during the monitoring period.     Preliminary diagnosis:    ICD-10-CM    1. LISA (generalized anxiety disorder)  F41.1       2. Psychosis, unspecified psychosis type (H)  F29       3. Alcohol dependence with unspecified alcohol-induced disorder (H)  F10.29       4. OCD (obsessive compulsive disorder)  F42.9            Treatment Plan:  -Restart Zoloft 50mg daily targeting anxiety as patient has found this to be previously helpful when sober. Will restart at lower dose given medication nonadherence and to ensure this does not contribute to worsening presentation. Patient has previously tolerated 200mg daily.   -Start Zyprexa 5mg at bedtime targeting paranoia and hallucinations and 5-10mg twice daily as needed   -Restart BuSpar 10mg twice daily for anxiety   -Comfort medications ordered including hydroxyzine for anxiety, tylenol for pain, melatonin and Trazodone for sleep  -Discontinue CIWA  -Referral for [facility] treatment upon discharge from [facility]  -Referral for outpatient  psychiatric med management upon discharge from [facility]   -Problem focused supportive therapy and education provided today related to patient's current and acute stressors, symptoms, and diagnoses.  -Remain on observation at [facility] with reassessment tomorrow    --  SHADY Umanzor CNP   [facility] EMERGENCY DEPT  [facility] Unit     Bibiana Kirk APRN CNP  06/17/23 [id number]       Bibiana Kirk APRN CNP  06/17/23 [id number]

## 2023-06-17 NOTE — PROGRESS NOTES
"Pt presented to the ED yesterday reporting withdrawal sx, anxiety, panic, and hallucinations. She denies any of these symptoms today, but is aware she was having bizarre behaviors. She reports she slept well and denies withdrawal sx at this time, CIWA score 2. She is alert and cooperative, denies SI or HI.  She reports being sober for 126 days, and relapsing in December. She has been drinking a liter \"or more\" of Vodka on a daily basis for the past couple of weeks. She reports smoking marijuana on occasion, \"my parents think I do more because of how I have been acting, I think the weed was laced.\" U tox positive for cannabis. Pt has not been med-compliant, unknown when she last took any. She is hoping to have an assessment, and go to [facility]  Inpatient treatment from here.  Search was completed, clean cloths/scrubs provided, tour and routine of unit was given. Pt ate a very small portion of breakfast and has been sleeping with no sign of distress.   "

## 2023-06-17 NOTE — PROGRESS NOTES
Pt has been sleeping sound up until now, she is snacking and ordering some lunch. She has no complaints other than drowsiness (she feels from the Valium). She denies hallucinations, or withdrawal sx at this time, she has a slight hand tremor, CIWA score is 3. Pt understands to report any returning symptoms, staff will continue to monitor. Pt pleasant, calm and cooperative with no sign of distress.

## 2023-06-17 NOTE — PLAN OF CARE
Olga Turner  June 17, 2023  Plan of Care Hand-off Note     Patient Care Path: Observation then [facility]    Plan for Care:     A lower level of care has been unsuccessful in treating and stabilizing patient s mental health symptoms. Patient will remain on [facility] unit under observation for continued monitoring, treatment and therapeutic intervention of mental health symptoms. Observation at [facility] could help mitigate the need for a more restrictive level of care in an inpatient setting. An LMHP will follow, working to address / discuss symptoms related to alcoholism, current life stressors, and ensure columbia assessment is completed to assess pt's risk of safety.     Due to pt's current state of confusion and inability to remain awake / oriented, pt will remain in ED for further observation until alert enough to go to [facility], with pt's consent.    Critical Safety Issues: Alcohol withdrawal     Overview:  This patient is a child/adolescent: No    This patient has additional special visitor precautions: No    Legal Status: Voluntary    Contacts:   None    Psychiatry Consult:  Psychiatry Consult not requested because N/A will be seen by psych. if pt transfers to [facility]    Updated RN and Attending Provider regarding plan of care.    Renetta Caro, LGSW

## 2023-06-17 NOTE — PROGRESS NOTES
[facility] Group Progress Note    Client Name: Olga Turner  Date: June 17, 2023  Service Type:  Group Therapy  Session Start Time:  605pm  Session End Time: 630pm  Session Length: 25min  Attendees: Patient and other group members  Facilitator:Yosvany Delarosa     Topic:   Personal goals and ways to make progress towards goals    Intervention:    Group process: support, challenge, affirm, psycho-education.     Response:  Patient did participate in group. Behavior in group was appropriate and engaged. Patient shared she is working on stability and ways she can make positive changes towards long term goals. Pt also shared she has friends and family she can share more about her struggles with.       NASIM ROSA

## 2023-06-17 NOTE — ED NOTES
Patient iv access was out-soaked with IVF and changed.  Warm blanket applied/made comfortable.  Stated by the patient that she is somewhat confused but oriented to name,place and date.

## 2023-06-17 NOTE — ED TRIAGE NOTES
Patient states last drank alcohol 6 days ago.  Last used marijuana yesterday.  Taking hydroxyzine but not the other medications.    Complaints of hallucinations.  Panicky, freaking out.

## 2023-06-17 NOTE — ED PROVIDER NOTES
"  History     Chief Complaint:  Drug / Alcohol Assessment and Hallucinations       HPI   Olga Turner is a 27 year old female with history of alcohol abuse who presents due to hallucinations. Olga states that she stopped drinking alcohol six days ago and had some nausea and emesis right away. While these have resolved, Eleanor states that she's had persisting tremors associated with increasing anxiety, depression, and panicking as well as some auditory and visual hallucinations. She notes that she has gone through withdrawal before, but denies that she's had hallucinations before. During evaluation, Eleanor mainly expresses concerns due to her anxiety and hallucinations, and states that she would also like mental health evaluation and is open to going to [facility]. Additionally, Eleanor does report that she has been losing weight recently due to her drinking. Eleanor denies any suicidal ideation. Of note, Eleanor states that while she has been taking her hydroxyzine daily, she has not been taking her naltrexone or other medications for the past five days.     Independent Historian:   None - Patient Only    Review of External Notes:   Reviewed note from April 24 where she was seen at [facility]-plan was for discharge to a detox facility    Asymptomatic COVID-19 PCR test result from 5/7/23  SARS CoV2 -  Positive     Medications:    Sertraline  Naltrexone  Buspirone   Hydroxyzine   Kariva birth control   Gabapentin     Past Medical History:    LISA  Alcohol abuse   Anorexia   OCD    Physical Exam     Patient Vitals for the past 24 hrs:   BP Temp Temp src Pulse Resp SpO2 Height Weight   06/17/23 0411 122/84 97.5  F (36.4  C) Tympanic 70 -- 98 % -- --   06/17/23 0030 -- -- -- -- 12 97 % -- --   06/17/23 0015 (!) 129/91 -- -- 97 15 -- -- --   06/16/23 2232 128/89 98.5  F (36.9  C) Oral 114 16 100 % 1.6 m (5' 3\") 52.2 kg (115 lb)        Physical Exam  General: Sitting up in bed  Eyes:  The pupils are equal and round    Conjunctivae " and sclerae are normal  ENT:    Wearing a mask  Neck:  Normal range of motion  CV:  Regular rate, regular rhythm     Skin warm and well perfused   Resp:  Non labored breathing on room air    No tachypnea    No cough heard  GI:  Abdomen is soft, there is no rigidity    No distension    No rebound tenderness     No abdominal tenderness  MS:  Mild bilateral hand tremors  Skin:  No rash or acute skin lesions noted  Neuro:   Awake, alert.      Speech is normal and fluent.    Face is symmetric.     Moves all extremities equally  Psych: Intermittent hallucinations    Emergency Department Course   ECG  ECG taken at 0452, ECG read at 0454  Normal sinus rhythm  Normal ECG   No prior ECG available for comparison  Rate 78 bpm. VT interval 184 ms. QRS duration 94 ms. QT/QTc 382/435 ms. P-R-T axes 71 81 64.     Laboratory:  Labs Ordered and Resulted from Time of ED Arrival to Time of ED Departure   COMPREHENSIVE METABOLIC PANEL - Abnormal       Result Value    Sodium 137      Potassium 3.4      Chloride 101      Carbon Dioxide (CO2) 23      Anion Gap 13      Urea Nitrogen 15.0      Creatinine 0.60      Calcium 9.3      Glucose 100 (*)     Alkaline Phosphatase 76      AST 50 (*)     ALT 22      Protein Total 7.3      Albumin 4.2      Bilirubin Total 0.3      GFR Estimate >90     MAGNESIUM - Abnormal    Magnesium 2.4 (*)    CBC WITH PLATELETS AND DIFFERENTIAL - Abnormal    WBC Count 10.7      RBC Count 4.10      Hemoglobin 12.6      Hematocrit 37.3      MCV 91      MCH 30.7      MCHC 33.8      RDW 14.3      Platelet Count 231      % Neutrophils 52      % Lymphocytes 31      % Monocytes 15      % Eosinophils 1      % Basophils 1      % Immature Granulocytes 0      NRBCs per 100 WBC 0      Absolute Neutrophils 5.6      Absolute Lymphocytes 3.3      Absolute Monocytes 1.6 (*)     Absolute Eosinophils 0.1      Absolute Basophils 0.1      Absolute Immature Granulocytes 0.0      Absolute NRBCs 0.0     DRUG ABUSE SCREEN 77 URINE (FL, RH,  SH) - Abnormal    Amphetamines Urine Screen Negative      Barbituates Urine Screen Negative      Benzodiazepine Urine Screen Negative      Cannabinoids Urine Screen Positive (*)     Opiates Urine Screen Negative      PCP Urine Screen Negative      Cocaine Urine Screen Negative     COVID-19 VIRUS (CORONAVIRUS) BY PCR - Normal    SARS CoV2 PCR Negative     ETHYL ALCOHOL LEVEL - Normal    Alcohol ethyl <0.01     TSH WITH FREE T4 REFLEX - Normal    TSH 2.28     HCG QUALITATIVE PREGNANCY - Normal    hCG Serum Qualitative Negative          Procedures   none    Emergency Department Course & Assessments:       Interventions:  Medications   0.9% sodium chloride BOLUS (0 mLs Intravenous Stopped 6/17/23 0200)     Followed by   sodium chloride 0.9% infusion (0 mLs Intravenous Infusion stopped per MD order 6/17/23 0415)   ondansetron (ZOFRAN) injection 4 mg (has no administration in time range)   OLANZapine zydis (zyPREXA) ODT tab 5-10 mg (has no administration in time range)     Or   haloperidol lactate (HALDOL) injection 2.5-5 mg (has no administration in time range)   flumazenil (ROMAZICON) injection 0.2 mg (has no administration in time range)   diazepam (VALIUM) tablet 10 mg (10 mg Oral $Given 6/17/23 0021)     Or   diazepam (VALIUM) injection 5-10 mg ( Intravenous See Alternative 6/17/23 0021)   thiamine (B-1) tablet 100 mg (100 mg Oral $Given 6/17/23 0024)   folic acid (FOLVITE) tablet 1 mg (1 mg Oral $Given 6/17/23 0024)   multivitamin w/minerals (THERA-VIT-M) tablet 1 tablet (1 tablet Oral $Given 6/17/23 0025)        Assessments:  2319 I obtained history and examined the patient as noted above.   0011 I rechecked the patient.  0105 I rechecked and updated the patient.   0615 I rechecked the patient. In shared decision making, the patient was deemed safe for [facility] and she agreed to the plan of care.     Independent Interpretation (X-rays, CTs, rhythm strip):  None    Consultations/Discussion of Management or  Tests:  0103 I spoke with the DEC accessor prior to her assessment of the patient.       Social Determinants of Health affecting care:   Stress/Adjustment Disorders    Disposition:  Plans for transfer to [facility]    Impression & Plan    CMS Diagnoses: None    Medical Decision Making:  Olga Turner is a 27-year-old female who presented to the emergency department with concerns for hallucinations.  Suspect that she is having alcohol withdrawal hallucinosis.  Does have mild hand tremors.  She thinks that she needs mental health help in addition to alcohol withdrawal as she drinks because of anxiety.  She would like to go to [facility].  Patient given oral Valium and then slept.  CIWA score less than 6 on recheck but patient drowsy.  DEC attempted to see her with a plan of going to [facility] but [facility] would like her to be awake before taking her.  DEC does still need to do assessment.  At this point, it seems like the withdrawal can still be managed in [facility] without needing inpatient medical admission but if the patient has worsening withdrawal symptoms requiring IV benzodiazepines then would need medical admission.    Diagnosis:    ICD-10-CM    1. 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 Anxiety  F41.9          Scribe Disclosure:  I, Deanne Clement, am serving as a scribe at 11:24 PM on 6/16/2023 to document services personally performed by Thania Nichole MD based on my observations and the provider's statements to me.     6/16/2023   Thania Nichole MD Goertz, Maria Kristine, MD  06/17/23 0752

## 2023-06-18 VITALS
RESPIRATION RATE: 16 BRPM | BODY MASS INDEX: 20.55 KG/M2 | TEMPERATURE: 97.7 F | HEART RATE: 82 BPM | DIASTOLIC BLOOD PRESSURE: 71 MMHG | OXYGEN SATURATION: 97 % | WEIGHT: 116 LBS | SYSTOLIC BLOOD PRESSURE: 106 MMHG | HEIGHT: 63 IN

## 2023-06-18 PROCEDURE — G0378 HOSPITAL OBSERVATION PER HR: HCPCS

## 2023-06-18 PROCEDURE — 250N000013 HC RX MED GY IP 250 OP 250 PS 637: Performed by: EMERGENCY MEDICINE

## 2023-06-18 PROCEDURE — 250N000013 HC RX MED GY IP 250 OP 250 PS 637

## 2023-06-18 PROCEDURE — 99239 HOSP IP/OBS DSCHRG MGMT >30: CPT | Performed by: NURSE PRACTITIONER

## 2023-06-18 RX ORDER — OLANZAPINE 5 MG/1
5 TABLET ORAL
Qty: 30 TABLET | Refills: 0 | Status: SHIPPED | OUTPATIENT
Start: 2023-06-18

## 2023-06-18 RX ADMIN — FOLIC ACID 1 MG: 1 TABLET ORAL at 10:15

## 2023-06-18 RX ADMIN — MULTIPLE VITAMINS W/ MINERALS TAB 1 TABLET: TAB at 10:15

## 2023-06-18 RX ADMIN — SERTRALINE HYDROCHLORIDE 50 MG: 50 TABLET ORAL at 10:14

## 2023-06-18 RX ADMIN — THIAMINE HCL TAB 100 MG 100 MG: 100 TAB at 10:15

## 2023-06-18 RX ADMIN — BUSPIRONE HYDROCHLORIDE 10 MG: 10 TABLET ORAL at 10:14

## 2023-06-18 ASSESSMENT — ACTIVITIES OF DAILY LIVING (ADL)
ADLS_ACUITY_SCORE: 35

## 2023-06-18 NOTE — ED PROVIDER NOTES
[facility]  [facility] Emergency Department  Discharge Date: 6/18/2023    Olga Turner MRN: [id number]   Age: 27 year old YOB: 1995     Brief HPI & Initial ED Course     Chief Complaint   Patient presents with     Drug / Alcohol Assessment     Hallucinations     HPI  Olga Turner is a 27 year old female with history notable for anxiety, depression, cannabis use and alcohol dependence [facility] symptoms of psychosis in the context of probable alcohol withdrawal.  Patient was psychiatrically assessed by Bibiana Paris CNP who focused treatment plan on restarting medications, safety monitoring and stabilization.  Zyprexa 5 mg nightly was started to target auditory hallucinations. No issues overnight.    Patient is being seen for follow up of psychosis.  On examination, patient no longer endorses auditory hallucinations. She slept well last night. Appetite good. No adverse reactions to medications. No suicidal thoughts. Indicates motivation to maintaining sobriety. She identifies negative consequences of her chemical use, such as loss of employment. She denies any cravings or signs of withdrawal. She found the milieu of the unit therapeutic. She feels it was helpful to obtain restorative sleep with the assistance of Zyprexa. She reports poor sleep for 2 nights prior to admission. She is interested in continuing Zyprexa for ongoing stability when she discharges home later today.    Physical Examination   BP: 106/71  Pulse: 82  Temp: 97.7  F (36.5  C)  Resp: 16  Height: 160 cm (5' 3\")  Weight: 52.6 kg (116 lb)  SpO2: 97 %    Physical Exam  General: Appears stated age.   Neuro: Alert and fully oriented. Extremities appear to demonstrate normal strength on visual inspection.   Integumentary/Skin: no rash visualized, normal color    Psychiatric Examination   Appearance: awake, alert and adequately groomed  Attitude:  " cooperative  Eye Contact:  good  Mood:  better  Affect:  appropriate and in normal range  Speech:  clear, coherent  Psychomotor Behavior:  no evidence of tardive dyskinesia, dystonia, or tics and intact station, gait and muscle tone  Thought Process:  logical, linear and goal oriented  Associations:  no loose associations  Thought Content:  no evidence of suicidal ideation or homicidal ideation and no evidence of psychotic thought  Insight:  good  Judgement:  intact  Oriented to:  time, person, and place  Attention Span and Concentration:  intact  Recent and Remote Memory:  intact  Language: able to name/identify objects without impairment  Fund of Knowledge: intact with awareness of current and past events    Results        Labs Ordered and Resulted from Time of ED Arrival to Time of ED Departure   COMPREHENSIVE METABOLIC PANEL - Abnormal       Result Value    Sodium 137      Potassium 3.4      Chloride 101      Carbon Dioxide (CO2) 23      Anion Gap 13      Urea Nitrogen 15.0      Creatinine 0.60      Calcium 9.3      Glucose 100 (*)     Alkaline Phosphatase 76      AST 50 (*)     ALT 22      Protein Total 7.3      Albumin 4.2      Bilirubin Total 0.3      GFR Estimate >90     MAGNESIUM - Abnormal    Magnesium 2.4 (*)    CBC WITH PLATELETS AND DIFFERENTIAL - Abnormal    WBC Count 10.7      RBC Count 4.10      Hemoglobin 12.6      Hematocrit 37.3      MCV 91      MCH 30.7      MCHC 33.8      RDW 14.3      Platelet Count 231      % Neutrophils 52      % Lymphocytes 31      % Monocytes 15      % Eosinophils 1      % Basophils 1      % Immature Granulocytes 0      NRBCs per 100 WBC 0      Absolute Neutrophils 5.6      Absolute Lymphocytes 3.3      Absolute Monocytes 1.6 (*)     Absolute Eosinophils 0.1      Absolute Basophils 0.1      Absolute Immature Granulocytes 0.0      Absolute NRBCs 0.0     DRUG ABUSE SCREEN 77 URINE (FL, RH, SH) - Abnormal    Amphetamines Urine Screen Negative      Barbituates Urine Screen  Negative      Benzodiazepine Urine Screen Negative      Cannabinoids Urine Screen Positive (*)     Opiates Urine Screen Negative      PCP Urine Screen Negative      Cocaine Urine Screen Negative     COVID-19 VIRUS (CORONAVIRUS) BY PCR - Normal    SARS CoV2 PCR Negative     ETHYL ALCOHOL LEVEL - Normal    Alcohol ethyl <0.01     TSH WITH FREE T4 REFLEX - Normal    TSH 2.28     HCG QUALITATIVE PREGNANCY - Normal    hCG Serum Qualitative Negative     HCG QUALITATIVE URINE - Normal    hCG Urine Qualitative Negative         Observation Course   The patient was found to have a psychiatric condition that would benefit from an observation stay in the emergency department for further psychiatric stabilization and/or coordination of a safe disposition. The plan upon observation admission included serial assessments of psychiatric condition, potential administration of medications if indicated, further disposition pending the patient's psychiatric course during the monitoring period.     Serial assessments of the patient's psychiatric condition were performed. Nursing notes were reviewed. During the observation period, the patient did not require medications for agitation, and did not require restraints/seclusion for patient and/or provider safety.     After a period of working with the treatment team on the [facility] unit, the patient's mental state improved to allow a safe transition to outpatient care. After counseling on the diagnosis, work-up, and treatment plan, the patient was discharged. Close follow-up with a psychiatrist and/or therapist was recommended and community psychiatric resources were provided. Patient is to return to the ED if any urgent or potentially life-threatening concerns.     Discharge Diagnoses:   Final diagnoses:   LISA (generalized anxiety disorder)   Psychosis, unspecified psychosis type (H)   Alcohol dependence with unspecified alcohol-induced disorder (H)   OCD (obsessive compulsive disorder)    Marijuana abuse       Treatment Plan:  -discharge home with safety plan in place  -CD assessment scheduled on 6/22/23 at 0800  -individual therapy scheduled on 6/22/23 at 1100  -Zyprexa 5 mg nightly as needed psychosis  -Resume other PTA medications without changes  -follow up with outpatient prescriber  -Medication education provided this visit includes, rationale for medication, importance of compliance, medication interactions, and common side effects.   -Problem focused, supportive therapy provided around patients stressors and symptoms related to their diagnosis.  Validated patients emotions and problems solved with patient on stress management and self care strategies. Patient was receptive.       At the time of discharge, the patient's acute suicide risk was determined to be low due to the following factors: Reduction in the intensity of mood/anxiety symptoms that preceded the admission, denial of suicidal thoughts, denies feeling helpless or helpless, not currently under the influence of alcohol or illicit substances, denies experiencing command hallucinations, no immediate access to firearms. The patient's acute risk could be higher if noncompliant with their treatment plan, medications, follow-up appointments or using illicit substances or alcohol. Protective factors include: social supports,stable housing.    I spent more than 30 minutes on discharge day activities.    --  SHADY Kelley CNP [facility] EMERGENCY DEPT  [facility] Unit    I,  SHADY Adrian CNP have personally performed an examination of this patient.  I have edited the note to reflect all relevant changes.  I have discussed this patient with the care team on 6/18/23.  I have reviewed all vitals and laboratory findings.        Mayuri Sage APRN CNP  06/18/23 4791

## 2023-06-18 NOTE — DISCHARGE INSTRUCTIONS
CD Assessment  [facility]  [address], [address]   [phone]     6/22/2023 8:00 am    Patient Instructions    THIS IS ONLY A RESERVATION. PATIENT MUST CALL [phone] TO PRE-REGISTER AND CONFIRM APPOINTMENT. Please arrive 15 min early with ID and insurance card. Insurance accepted: [place], PMAPs, commercial insurance. If pt has no insurance, we have a MNSure Navigator available to assist in applying for state insurance. We have 3 locations - intake appts available in [place] or [place], depending on day/time of week. Please call [phone] to verify location and pre-register to confirm appt.    Therapy    Oli Marie PsyD  [facility]  [address], [address]   [phone]   6/22/2023 11:00 am    Patient Instructions    For Telehealth we will need your paperwork before you are seen. Website will give directions to us for in person, [email or web address] Please arrive 20 minutes early for in person appointments. Bring insurance card. Metered street or ramp parking.    Home  [facility]    [facility] offers in person psychological counseling as well as TeleHealth remote therapy counseling, comprehensive psychological and cognitive testing, and massage t...    Medication Management    [facility] [employer]    [address] [place] [address]   [phone]   7/11/2023 2:10 pm    Patient Instructions    For patients being seen in office, please arrive 15 minutes early to check-in and complete intake paperwork. For patients being seen via telehealth, the provider will email a link to the patient to connect to the video session. Intake paperwork will be emailed prior to the appointment.      Aftercare Plan  If I am feeling unsafe or I am in a crisis, I will:   Contact my established care providers   Call the National Suicide  "Prevention Lifeline: 988  Go to the nearest emergency room   Call 911     Warning signs that I or other people might notice when a crisis is developing for me: My eyes get weird looking, I get fidgety, anxious, more quiet.    Things I am able to do on my own to cope or help me feel better: Working out, being outside, getting some sun, be around supportive people.     Things that I am able to do with others to cope or help me better: Talking, spending time with healthy people.     Things I can use or do for distraction: Watching Netflix, play on my cell phone.       Changes I can make to support my mental health and wellness: Go to AA, inpatient CD tx, get a therapist.     People in my life that I can ask for help: My boyfriend, my parents, sister, Damon.     [facility] has a mental health crisis team you can call 24/7: [facility] Mobile Crisis  [phone]     Other things that are important when I'm in crisis: Find my support system.     Additional resources and information: See information for your outpatient appointments.        Crisis Lines  Crisis Text Line  Text 960195  You will be connected with a trained live crisis counselor to provide support.    Por espanol, texto  ELINOR a 996955 o texto a 442-AYUDAME en WhatsApp    The Ari Project (LGBTQ Youth Crisis Line)  3.705.396.6662  text START to 205-852      Community Resources  Fast Tracker  Linking people to mental health and substance use disorder resources  fasttrackermn.org     [place] Mental Health Warm Line  Peer to peer support  Monday thru Saturday, 12 pm to 10 pm  531.760.6695 or 7.295.465.3933  Text \"Support\" to 85876    National [place] on Mental Illness (MANUEL)  442.540.1495 or 1.888.MANUEL.HELPS      Mental Health Apps  My3  https://myTaxi 24/7pp.org/    VirtualHopeBox  https://Vestorly.org/apps/virtual-hope-box/      Additional Information  Today you were seen by a licensed mental health professional through Triage and " Transition services, Behavioral Healthcare Providers ([facility])  for a crisis assessment in the Emergency Department at [facility].  It is recommended that you follow up with your established providers (psychiatrist, mental health therapist, and/or primary care doctor - as relevant) as soon as possible. Coordinators from [facility] will be calling you in the next 24-48 hours to ensure that you have the resources you need.  You can also contact [facility] coordinators directly at [phone]. You may have been scheduled for or offered an appointment with a mental health provider. [facility] maintains an extensive network of licensed behavioral health providers to connect patients with the services they need.  We do not charge providers a fee to participate in our referral network.  We match patients with providers based on a patient's specific needs, insurance coverage, and location.  Our first effort will be to refer you to a provider within your care system, and will utilize providers outside your care system as needed.

## 2023-06-18 NOTE — ED NOTES
[facility] Group Progress Note    Client Name: Olga Turner  Date: June 18, 2023  Service Type:  Group Therapy  Session Start Time:  10:30am  Session End Time: 11:30am  Session Length: 45 minutes  Attendees: Patient and other group members  Facilitator: LMHP and PA     Topic:   Mindfulness, meditation    Intervention:    Group process: support, challenge, affirm, psycho-education.     Response:  Patient did participate in group. Behavior in group was tired, sleepy. Patient shared she's feeling anxious.  Her goal today was to journal.       Matilde Grayson [facility] Center[facility]

## 2023-06-18 NOTE — ED NOTES
Patient was withdrawn most of the evening to her chair. Slept on and off. Woke up for dinner and medications. Requested PRN for anxiety after meeting with the provider. Hydroxyzine given. Presented with a flat affect, but was calm in mood. Patient no longer experiencing any hallucinations. Appears more organized. Denies SI/HI. Plan for patient is to monitor overnight on observation status. Patient to start Zyprexa 5 mg this evening. Provider also restarted Zoloft and Buspar. CIWA discontinued. Will continue to monitor overnight and reassess tomorrow.

## 2023-06-18 NOTE — PROGRESS NOTES
Pt reports sleeping well through the NOC , and she slept late this AM. She is reporting feeling better, she denies any withdrawal sx, no SI/HI, or hallucinations.She has been eating and drinking, she attended group, has been coloring, Reading, and writing in her Journal. Medications were restarted and administered. She does not feel safe returning to her apartment, and would stay with a Friend if discharged today. She continues to want to go to treatment from here.

## 2023-06-18 NOTE — PROGRESS NOTES
[facility] Crisis Reassessment      Olga Turner was reassessed at the request of Rn for the following reasons: pt is observation status and will likely discharge today. Pt was first seen on 6/17/2023 by Renetta Caro; see the initial assessment note for details.      Patient Presentation    Initial ED presentation details: Alcohol withdrawal, auditory and visual hallucinations and paranoia.    Current patient presentation: Pt reports she is feeling good, she's feeling a little anxious; worried about what her parents will think.  Pt reports she felt safe her.  Pt [place].      Changes observed since initial assessment: Pt reports she is a lot more motivated to get sober and stay sober.  Pt states a weight is now off of her shoulders; she was able to pause from the outside world.        Risk of Harm    [place] Suicide Severity Rating Scale Since Last Contact: 6/18/2023  Suicidal Ideation (Since Last Contact)  1. Wish to be Dead (Since Last Contact): No  2. Non-Specific Active Suicidal Thoughts (Since Last Contact): No  Suicidal Behavior (Since Last Contact)  Actual Attempt (Since Last Contact): No  Has subject engaged in non-suicidal self-injurious behavior? (Since Last Contact): No  Interrupted Attempts (Since Last Contact): No  Aborted or Self-Interrupted Attempt (Since Last Contact): No  Preparatory Acts or Behavior (Since Last Contact): No  Suicide (Since Last Contact): No  Actual/Potential Lethality (Most Lethal Attempt)  Actual Lethality/Medical Damage Code (Most Lethal Attempt): No physical damage or very minor physical damage  Potential Lethality Code (Most Lethal Attempt): Behavior not likely to result in injury  C-SSRS Risk (Since Last Contact)  Calculated C-SSRS Risk Score (Since Last Contact): No Risk Indicated    Validity of evaluation is not impacted by presenting factors during interview.   Comments regarding subjective versus objective responses to  [place] tool:   Environmental or Psychosocial Events: challenging interpersonal relationships, geographic isolation from supports, barriers to accessing healthcare and other life stressors  Chronic Risk Factors: other: substance abuse   Warning Signs: talking or writing about death, dying, or suicide and hopelessness  Protective Factors: strong bond to family unit, community support, or employment, intact marriage or domestic partnership, lives in a responsibly safe and stable environment, help seeking, optimistic outlook - identification of future goals, constructive use of leisure time, enjoyable activities, resilience and other identified factors which may mitigate risk for suicide: motivated to get and stay sober.  Interpretation of Risk Scoring, Risk Mitigation Interventions and Safety Plan:  Pt is a low risk.    Does the patient have thoughts of harming others? No    Mental Status Exam   Affect: Appropriate   Appearance: Appropriate    Attention Span/Concentration: Attentive?    Eye Contact: Engaged   Fund of Knowledge: Appropriate    Language /Speech Content: Fluent   Language /Speech Volume: Normal    Language /Speech Rate/Productions: Articulate and Normal    Recent Memory: Intact   Remote Memory: Intact   Mood: Anxious    Orientation to Person: Yes    Orientation to Place: Yes   Orientation to Time of Day: Yes    Orientation to Date: Yes    Situation (Do they understand why they are here?): Yes    Psychomotor Behavior: Normal    Thought Content: Clear   Thought Form: Goal Directed and Intact       Additional Collateral Information   None.      Therapeutic Intervention  The following therapeutic methodologies were employed when working with the patient: Establishing rapport, Active listening, Establish a discharge plan, Motivational Interviewing and Safety planning. Patient response to intervention: calm and cooperative.    Diagnosis:   300.00 (F41.9) Unspecified Anxiety Disorder - by  history  Substance-Related & Addictive Disorders 291.9 (F10.99) Unspecified Alcohol Related Disorder - by history     Clinical Substantiation of Recommendations  Pt is not an imminent danger to herself or others.  She is open to outpatient services and medication management, therapy and SUDS assessment has been scheduled on pt's behalf.      Plan:    Disposition  Recommended disposition: Individual Therapy, Medication Management and Substance Abuse Disorder Treatment      Reviewed case and recommendations with attending provider. Attending Name: Mayuri Sage      Attending concurs with disposition: Yes      Patient and/or verified legal guardian concurs with disposition: Yes      Final disposition: Individual therapy , Medication management and Substance abuse disorder treatment .         Assessment Details  Total duration spent on the patient case in minutes: .50 hrs     CPT code(s) utilized: 32476 - Psychotherapy (with patient) - 30 (16-37*) min       Matilde Grayson, [facility], [facility]  Callback: [phone]      CD Assessment  [facility]  [address], [address] [phone]     6/22/2023 8:00 am    Patient Instructions    THIS IS ONLY A RESERVATION. PATIENT MUST CALL [phone] TO PRE-REGISTER AND CONFIRM APPOINTMENT. Please arrive 15 min early with ID and insurance card. Insurance accepted: [place], PMAPs, commercial insurance. If pt has no insurance, we have a MNSure Navigator available to assist in applying for state insurance. We have 3 locations - intake appts available in [place] or [place], depending on day/time of week. Please call [phone] to verify location and pre-register to confirm appt.    Therapy    Oli Marie PsyD  [facility]  [address], [address]   [phone]   6/22/2023 11:00 am    Patient Instructions    For Telehealth we will need your paperwork before you are seen. Website will give directions  to us for in person, [email or web address] Please arrive 20 minutes early for in person appointments. Bring insurance card. Metered street or ramp parking.    Home  [facility]    [facility] offers in person psychological counseling as well as TeleHealth remote therapy counseling, comprehensive psychological and cognitive testing, and massage t...    Medication Management    Lino Son DNP, PA-C   [facility]    [address], [place] [address]   [phone]   7/11/2023 2:10 pm    Patient Instructions    For patients being seen in office, please arrive 15 minutes early to check-in and complete intake paperwork. For patients being seen via telehealth, the provider will email a link to the patient to connect to the video session. Intake paperwork will be emailed prior to the appointment.      Aftercare Plan  If I am feeling unsafe or I am in a crisis, I will:   Contact my established care providers   Call the National Suicide Prevention Lifeline: 988  Go to the nearest emergency room   Call 911     Warning signs that I or other people might notice when a crisis is developing for me: My eyes get weird looking, I get fidgety, anxious, more quiet.    Things I am able to do on my own to cope or help me feel better: Working out, being outside, getting some sun, be around supportive people.     Things that I am able to do with others to cope or help me better: Talking, spending time with healthy people.     Things I can use or do for distraction: Watching Netflix, play on my cell phone.       Changes I can make to support my mental health and wellness: Go to AA, inpatient CD tx, get a therapist.     People in my life that I can ask for help: My boyfriend, my parents, sister, Damon.     Your [facility] has a mental health crisis team you can call 24/7: [facility]  [phone]     Other things that are important when  "I'm in crisis: Find my support system.     Additional resources and information: See information for your outpatient appointments.        Crisis Lines  Crisis Text Line  Text 438033  You will be connected with a trained live crisis counselor to provide support.    Radha cj, mirnao  ELINOR a 896173 o texto a 442-AYUDAME en WhatsApp    [employer] (LGBTQ Youth Crisis Line)  5.436.962.2194  text START to 640-178      [employer]  Fast Tracker  Linking people to mental health and substance use disorder resources  Faveous.ONEPLE     [place] Mental Health Warm Line  Peer to peer support  Monday thru Saturday, 12 pm to 10 pm  573.631.6148 or 8.960.869.0149  Text \"Support\" to 10478    National [place] on Mental Illness (MANUEL)  544.732.9012 or 1.888.MANUEL.HELPS      Mental Health Apps  My3  https://Architectural Daily.org/    VirtualHopeBox  https://Betterific/apps/virtual-hope-box/      Additional Information  Today you were seen by a licensed mental health professional through Triage and Transition services, [facility] ([facility])  for a crisis assessment in the Emergency Department at [facility].  It is recommended that you follow up with your established providers (psychiatrist, mental health therapist, and/or primary care doctor - as relevant) as soon as possible. Coordinators from [facility] will be calling you in the next 24-48 hours to ensure that you have the resources you need.  You can also contact [facility] coordinators directly at [phone]. You may have been scheduled for or offered an appointment with a mental health provider. [facility] maintains an extensive network of licensed behavioral health providers to connect patients with the services they need.  We do not charge providers a fee to participate in our referral network.  We match patients with providers based on a patient's specific needs, insurance coverage, and location.  Our first effort will be to refer you to a " provider within your care system, and will utilize providers outside your care system as needed.

## 2023-06-18 NOTE — PROGRESS NOTES
Pt met with [facility], she has showered and has been active on the unit with no complaints.

## 2023-07-30 ENCOUNTER — HEALTH MAINTENANCE LETTER (OUTPATIENT)
Age: 28
End: 2023-07-30

## 2023-12-19 NOTE — ED NOTES
Patient agrees to discharge plan. Discharge instructions reviewed with patient including follow-up care plan. Medications: ordered and sent to the pt's own pharmacy. Reviewed safety plan and outpatient resources. Denies SI and HI. All belongings that were brought into the hospital have been returned to patient. Escorted off the unit at 1555 accompanied by [facility] staff. Discharged to home via pt's own ride.      73

## 2024-09-22 ENCOUNTER — HEALTH MAINTENANCE LETTER (OUTPATIENT)
Age: 29
End: 2024-09-22